# Patient Record
Sex: FEMALE | Race: BLACK OR AFRICAN AMERICAN | NOT HISPANIC OR LATINO | Employment: FULL TIME | URBAN - METROPOLITAN AREA
[De-identification: names, ages, dates, MRNs, and addresses within clinical notes are randomized per-mention and may not be internally consistent; named-entity substitution may affect disease eponyms.]

---

## 2017-04-03 ENCOUNTER — OFFICE VISIT (OUTPATIENT)
Dept: URGENT CARE | Facility: URGENT CARE | Age: 22
End: 2017-04-03
Payer: MEDICAID

## 2017-04-03 VITALS
BODY MASS INDEX: 23.05 KG/M2 | OXYGEN SATURATION: 97 % | HEART RATE: 68 BPM | DIASTOLIC BLOOD PRESSURE: 72 MMHG | WEIGHT: 122 LBS | TEMPERATURE: 97.8 F | SYSTOLIC BLOOD PRESSURE: 110 MMHG

## 2017-04-03 DIAGNOSIS — R30.0 DYSURIA: Primary | ICD-10-CM

## 2017-04-03 LAB
ALBUMIN UR-MCNC: 100 MG/DL
APPEARANCE UR: CLEAR
BACTERIA #/AREA URNS HPF: ABNORMAL /HPF
BILIRUB UR QL STRIP: NEGATIVE
COLOR UR AUTO: YELLOW
GLUCOSE UR STRIP-MCNC: NEGATIVE MG/DL
HGB UR QL STRIP: ABNORMAL
KETONES UR STRIP-MCNC: ABNORMAL MG/DL
LEUKOCYTE ESTERASE UR QL STRIP: ABNORMAL
MUCOUS THREADS #/AREA URNS LPF: PRESENT /LPF
NITRATE UR QL: NEGATIVE
NON-SQ EPI CELLS #/AREA URNS LPF: ABNORMAL /LPF
PH UR STRIP: 6.5 PH (ref 5–7)
RBC #/AREA URNS AUTO: ABNORMAL /HPF (ref 0–2)
SP GR UR STRIP: 1.02 (ref 1–1.03)
URN SPEC COLLECT METH UR: ABNORMAL
UROBILINOGEN UR STRIP-ACNC: 1 EU/DL (ref 0.2–1)
WBC #/AREA URNS AUTO: ABNORMAL /HPF (ref 0–2)

## 2017-04-03 PROCEDURE — 81001 URINALYSIS AUTO W/SCOPE: CPT | Performed by: FAMILY MEDICINE

## 2017-04-03 PROCEDURE — 87088 URINE BACTERIA CULTURE: CPT | Performed by: FAMILY MEDICINE

## 2017-04-03 PROCEDURE — 87186 SC STD MICRODIL/AGAR DIL: CPT | Performed by: FAMILY MEDICINE

## 2017-04-03 PROCEDURE — 87086 URINE CULTURE/COLONY COUNT: CPT | Performed by: FAMILY MEDICINE

## 2017-04-03 PROCEDURE — 99213 OFFICE O/P EST LOW 20 MIN: CPT | Performed by: FAMILY MEDICINE

## 2017-04-03 RX ORDER — NITROFURANTOIN 25; 75 MG/1; MG/1
100 CAPSULE ORAL 2 TIMES DAILY
Qty: 14 CAPSULE | Refills: 0 | Status: SHIPPED | OUTPATIENT
Start: 2017-04-03 | End: 2018-01-02

## 2017-04-03 NOTE — NURSING NOTE
"Chief Complaint   Patient presents with     UTI     x 1week       Initial /72 (BP Location: Left arm, Patient Position: Chair)  Pulse 68  Temp 97.8  F (36.6  C) (Oral)  Wt 122 lb (55.3 kg)  SpO2 97%  BMI 23.05 kg/m2 Estimated body mass index is 23.05 kg/(m^2) as calculated from the following:    Height as of 12/13/16: 5' 1\" (1.549 m).    Weight as of this encounter: 122 lb (55.3 kg).  Medication Reconciliation: complete  "

## 2017-04-03 NOTE — MR AVS SNAPSHOT
"              After Visit Summary   4/3/2017    Kristi French    MRN: [id number]           Patient Information     Date Of Birth          1995        Visit Information        Provider Department      4/3/2017 4:45 PM Lucia Patricia MD [facility]        Today's Diagnoses     Dysuria    -  1       Follow-ups after your visit        Who to contact     If you have questions or need follow up information about today's clinic visit or your schedule please contact [facility] [place] [facility] directly at [phone].  Normal or non-critical lab and imaging results will be communicated to you by MemBlazehart, letter or phone within 4 business days after the clinic has received the results. If you do not hear from us within 7 days, please contact the clinic through MemBlazehart or phone. If you have a critical or abnormal lab result, we will notify you by phone as soon as possible.  Submit refill requests through Kanchufang or call your pharmacy and they will forward the refill request to us. Please allow 3 business days for your refill to be completed.          Additional Information About Your Visit        MyChart Information     Kanchufang lets you send messages to your doctor, view your test results, renew your prescriptions, schedule appointments and more. To sign up, go to [email or web address] . Click on \"Log in\" on the left side of the screen, which will take you to the Welcome page. Then click on \"Sign up Now\" on the right side of the page.     You will be asked to enter the access code listed below, as well as some personal information. Please follow the directions to create your username and password.     Your access code is: [email or web address]  Expires: 2017  5:57 PM     Your access code will  in 90 days. If you need help or a new code, please call your [place] clinic or [phone].        Care EveryWhere ID     This is your Care EveryWhere ID. This could be used by " other organizations to access your [place] medical records  [id number]        Your Vitals Were     Pulse Temperature Pulse Oximetry BMI (Body Mass Index)          68 97.8  F (36.6  C) (Oral) 97% 23.05 kg/m2         Blood Pressure from Last 3 Encounters:   04/03/17 110/72   12/13/16 100/74   11/17/16 118/73    Weight from Last 3 Encounters:   04/03/17 122 lb (55.3 kg)   12/13/16 105 lb (47.6 kg)   11/17/16 110 lb (49.9 kg)              We Performed the Following     UA with Microscopic     Urine Culture Aerobic Bacterial          Today's Medication Changes          These changes are accurate as of: 4/3/17  5:57 PM.  If you have any questions, ask your nurse or doctor.               Start taking these medicines.        Dose/Directions    nitrofurantoin (macrocrystal-monohydrate) 100 MG capsule   Commonly known as:  MACROBID   Used for:  Dysuria   Started by:  Lucia Patricia MD        Dose:  100 mg   Take 1 capsule (100 mg) by mouth 2 times daily   Quantity:  14 capsule   Refills:  0            Where to get your medicines      Some of these will need a paper prescription and others can be bought over the counter.  Ask your nurse if you have questions.     Bring a paper prescription for each of these medications     nitrofurantoin (macrocrystal-monohydrate) 100 MG capsule                Primary Care Provider Fax #    [place] [place] [facility] [phone] [address] [place]  [facility] [address]        Thank you!     Thank you for choosing [facility]  for your care. Our goal is always to provide you with excellent care. Hearing back from our patients is one way we can continue to improve our services. Please take a few minutes to complete the written survey that you may receive in the mail after your visit with us. Thank you!             Your Updated Medication List - Protect others around you: Learn how to safely use, store and throw away your medicines at  www.disposemymeds.org.          This list is accurate as of: 4/3/17  5:57 PM.  Always use your most recent med list.                   Brand Name Dispense Instructions for use    nitrofurantoin (macrocrystal-monohydrate) 100 MG capsule    MACROBID    14 capsule    Take 1 capsule (100 mg) by mouth 2 times daily

## 2017-04-03 NOTE — PROGRESS NOTES
SUBJECTIVE:   Kristi French is a 22 year old female who  presents today for a possible UTI. Symptoms of dysuria and frequency have been going on for 7day(s).  Hematuria no.  gradual onsetand moderate.  There is no history of fever, chills, nausea or vomiting.  No history of vaginal This patient does not have a history of urinary tract infections. Patient denies rigors, flank pain, temperature > 101 degrees F. and Vomiting, significant nausea or diarrhea or vaginal discharge     Past Medical History:   Diagnosis Date     No active medical problems      Current Outpatient Prescriptions   Medication Sig Dispense Refill     nitrofurantoin, macrocrystal-monohydrate, (MACROBID) 100 MG capsule Take 1 capsule (100 mg) by mouth 2 times daily 14 capsule 0     Social History   Substance Use Topics     Smoking status: Never Smoker     Smokeless tobacco: Never Used     Alcohol use No       ROS:   Review of systems negative except as stated above.    OBJECTIVE:  /72 (BP Location: Left arm, Patient Position: Chair)  Pulse 68  Temp 97.8  F (36.6  C) (Oral)  Wt 122 lb (55.3 kg)  SpO2 97%  BMI 23.05 kg/m2  GENERAL APPEARANCE: healthy, alert and no distress  RESP: lungs clear to auscultation - no rales, rhonchi or wheezes  CV: regular rates and rhythm, normal S1 S2, no murmur noted  ABDOMEN:  soft, nontender, no HSM or masses and bowel sounds normal  BACK: No CVA tenderness  SKIN: no suspicious lesions or rashes    Results for orders placed or performed in visit on 04/03/17   UA with Microscopic   Result Value Ref Range    Color Urine Yellow     Appearance Urine Clear     Glucose Urine Negative NEG mg/dL    Bilirubin Urine Negative NEG    Ketones Urine Trace (A) NEG mg/dL    Specific Gravity Urine 1.025 1.003 - 1.035    pH Urine 6.5 5.0 - 7.0 pH    Protein Albumin Urine 100 (A) NEG mg/dL    Urobilinogen Urine 1.0 0.2 - 1.0 EU/dL    Nitrite Urine Negative NEG    Blood Urine Moderate (A) NEG    Leukocyte Esterase Urine  Small (A) NEG    Source Midstream Urine     WBC Urine 5-10 (A) 0 - 2 /HPF    RBC Urine 25-50 (A) 0 - 2 /HPF    Squamous Epithelial /LPF Urine Moderate (A) FEW /LPF    Bacteria Urine Many (A) NEG /HPF    Mucous Urine Present (A) NEG /LPF       ASSESSMENT:   Kristi was seen today for uti.    Diagnoses and all orders for this visit:    Dysuria  -     UA with Microscopic  -     nitrofurantoin, macrocrystal-monohydrate, (MACROBID) 100 MG capsule; Take 1 capsule (100 mg) by mouth 2 times daily  -     Urine Culture Aerobic Bacterial        PLAN:  As per ordered above  Drink plenty of fluids.  Prevention and treatment of UTI's discussed.Signs and symptoms of pyelonephritis mentioned.  Follow up with primary care physician if not improving

## 2017-04-05 LAB
BACTERIA SPEC CULT: ABNORMAL
MICRO REPORT STATUS: ABNORMAL
MICROORGANISM SPEC CULT: ABNORMAL
SPECIMEN SOURCE: ABNORMAL

## 2017-06-30 ENCOUNTER — TELEPHONE (OUTPATIENT)
Dept: OBGYN | Facility: CLINIC | Age: 22
End: 2017-06-30

## 2017-06-30 NOTE — TELEPHONE ENCOUNTER
Pt is unsure when her LMP was, thinks she is just over a month pregnant. Has had nausea throughout the day.  Has vomitied about 3 times.  Asks about management of nausea.    Advised to try danitza root tea or capsule, gingerale, small snacks, unisom with vitamin b6.  I gave her a few options to try and pt will try these and call back if nothing helps.    Shavon Muñoz R.N.  [facility]

## 2017-07-19 ENCOUNTER — TELEPHONE (OUTPATIENT)
Dept: OBGYN | Facility: CLINIC | Age: 22
End: 2017-07-19

## 2017-07-19 ENCOUNTER — RADIANT APPOINTMENT (OUTPATIENT)
Dept: ULTRASOUND IMAGING | Facility: CLINIC | Age: 22
End: 2017-07-19
Attending: OBSTETRICS & GYNECOLOGY
Payer: COMMERCIAL

## 2017-07-19 ENCOUNTER — PRENATAL OFFICE VISIT (OUTPATIENT)
Dept: NURSING | Facility: CLINIC | Age: 22
End: 2017-07-19
Payer: COMMERCIAL

## 2017-07-19 DIAGNOSIS — Z32.01 PREGNANCY TEST POSITIVE: ICD-10-CM

## 2017-07-19 DIAGNOSIS — D64.9 LOW HEMOGLOBIN: Primary | ICD-10-CM

## 2017-07-19 DIAGNOSIS — Z34.01 ENCOUNTER FOR SUPERVISION OF NORMAL FIRST PREGNANCY IN FIRST TRIMESTER: ICD-10-CM

## 2017-07-19 DIAGNOSIS — Z32.01 PREGNANCY TEST POSITIVE: Primary | ICD-10-CM

## 2017-07-19 LAB
ABO + RH BLD: NORMAL
ABO + RH BLD: NORMAL
BETA HCG QUAL IFA URINE: POSITIVE
BLD GP AB SCN SERPL QL: NORMAL
BLOOD BANK CMNT PATIENT-IMP: NORMAL
ERYTHROCYTE [DISTWIDTH] IN BLOOD BY AUTOMATED COUNT: 18.1 % (ref 10–15)
HBV SURFACE AG SERPL QL IA: NONREACTIVE
HCT VFR BLD AUTO: 34.1 % (ref 35–47)
HGB BLD-MCNC: 11.2 G/DL (ref 11.7–15.7)
HIV 1+2 AB+HIV1 P24 AG SERPL QL IA: NORMAL
MCH RBC QN AUTO: 26.6 PG (ref 26.5–33)
MCHC RBC AUTO-ENTMCNC: 32.8 G/DL (ref 31.5–36.5)
MCV RBC AUTO: 81 FL (ref 78–100)
PLATELET # BLD AUTO: 396 10E9/L (ref 150–450)
RBC # BLD AUTO: 4.21 10E12/L (ref 3.8–5.2)
SPECIMEN EXP DATE BLD: NORMAL
WBC # BLD AUTO: 9.4 10E9/L (ref 4–11)

## 2017-07-19 PROCEDURE — 36415 COLL VENOUS BLD VENIPUNCTURE: CPT | Performed by: OBSTETRICS & GYNECOLOGY

## 2017-07-19 PROCEDURE — 86850 RBC ANTIBODY SCREEN: CPT | Performed by: OBSTETRICS & GYNECOLOGY

## 2017-07-19 PROCEDURE — 86780 TREPONEMA PALLIDUM: CPT | Performed by: OBSTETRICS & GYNECOLOGY

## 2017-07-19 PROCEDURE — 84703 CHORIONIC GONADOTROPIN ASSAY: CPT | Performed by: OBSTETRICS & GYNECOLOGY

## 2017-07-19 PROCEDURE — 99207 ZZC NO CHARGE NURSE ONLY: CPT

## 2017-07-19 PROCEDURE — 85027 COMPLETE CBC AUTOMATED: CPT | Performed by: OBSTETRICS & GYNECOLOGY

## 2017-07-19 PROCEDURE — 87340 HEPATITIS B SURFACE AG IA: CPT | Performed by: OBSTETRICS & GYNECOLOGY

## 2017-07-19 PROCEDURE — 87086 URINE CULTURE/COLONY COUNT: CPT | Performed by: OBSTETRICS & GYNECOLOGY

## 2017-07-19 PROCEDURE — 87389 HIV-1 AG W/HIV-1&-2 AB AG IA: CPT | Performed by: OBSTETRICS & GYNECOLOGY

## 2017-07-19 PROCEDURE — 86900 BLOOD TYPING SEROLOGIC ABO: CPT | Performed by: OBSTETRICS & GYNECOLOGY

## 2017-07-19 PROCEDURE — 86901 BLOOD TYPING SEROLOGIC RH(D): CPT | Performed by: OBSTETRICS & GYNECOLOGY

## 2017-07-19 PROCEDURE — 86762 RUBELLA ANTIBODY: CPT | Performed by: OBSTETRICS & GYNECOLOGY

## 2017-07-19 PROCEDURE — 76815 OB US LIMITED FETUS(S): CPT | Performed by: OBSTETRICS & GYNECOLOGY

## 2017-07-19 NOTE — PROGRESS NOTES
Pt attended NPN nurse education class.    Lab Results   Component Value Date    PAP NIL 07/31/2012     Shavon Muñoz R.N.  [facility]

## 2017-07-19 NOTE — MR AVS SNAPSHOT
After Visit Summary   7/19/2017    Kristi French    MRN: [id number]           Patient Information     Date Of Birth          1995        Visit Information        Provider Department      7/19/2017 9:00 AM  PRENATAL NURSE [facility]        Today's Diagnoses     Pregnancy test positive    -  1       Follow-ups after your visit        Your next 10 appointments already scheduled     Jul 19, 2017 11:45 AM CDT   [facility]   [facility] ([facility])    [address] [id number] [phone]           Patient must bring picture ID.  Patient should be prepared to give a urine specimen  Please do not eat 10-12 hours before your appointment if you are coming in fasting for labs on lipids, cholesterol, or glucose (sugar).  Pregnant women should follow their Care Team instructions. Water with medications is okay. Do not drink coffee or other fluids.   If you have concerns about taking  your medications, please ask at office or if scheduling via [employer] send a message by clicking on Secure Messaging, Message Your Care Team.            Jul 19, 2017  2:00 PM CDT   US OB < 14 WEEKS SINGLE with OXUS1   [facility] ([facility])    [address] [id number] [phone]           Please bring a list of your medicines (including vitamins, minerals and over-the-counter drugs). Also, tell your doctor about any allergies you may have. Wear comfortable clothes and leave your valuables at home.  If you re less than 20 weeks drink four 8-ounce glasses of fluid an hour before your exam. If you need to empty your bladder before your exam, try to release only a little urine. Then, drink another glass of fluid.  You may have up to two family members in the exam room. If you bring a small child, an adult must be there to care for him or her.   "Please call the Imaging Department at your exam site with any questions.              Future tests that were ordered for you today     Open Future Orders        Priority Expected Expires Ordered    US OB < 14 weeks, single,  for dating (BCL360) Routine  10/17/2017 2017            Who to contact     If you have questions or need follow up information about today's clinic visit or your schedule please contact [facility] directly at [phone].  Normal or non-critical lab and imaging results will be communicated to you by Capigamihart, letter or phone within 4 business days after the clinic has received the results. If you do not hear from us within 7 days, please contact the clinic through Capigamihart or phone. If you have a critical or abnormal lab result, we will notify you by phone as soon as possible.  Submit refill requests through Kodkod or call your pharmacy and they will forward the refill request to us. Please allow 3 business days for your refill to be completed.          Additional Information About Your Visit        Kodkod Information     Kodkod lets you send messages to your doctor, view your test results, renew your prescriptions, schedule appointments and more. To sign up, go to [email or web address] . Click on \"Log in\" on the left side of the screen, which will take you to the Welcome page. Then click on \"Sign up Now\" on the right side of the page.     You will be asked to enter the access code listed below, as well as some personal information. Please follow the directions to create your username and password.     Your access code is: [email or web address]  Expires: 10/17/2017 10:57 AM     Your access code will  in 90 days. If you need help or a new code, please call your [place] clinic or [phone].        Care EveryWhere ID     This is your Care EveryWhere ID. This could be used by other organizations to access your [place] medical records  [id number]        Your " Vitals Were     Last Period                   04/25/2017 (Within Weeks)            Blood Pressure from Last 3 Encounters:   04/03/17 110/72   12/13/16 100/74   11/17/16 118/73    Weight from Last 3 Encounters:   04/03/17 122 lb (55.3 kg)   12/13/16 105 lb (47.6 kg)   11/17/16 110 lb (49.9 kg)              We Performed the Following     ABO/RH Type and Screen     Anti Treponema     Beta HCG qual IFA urine     CBC with Platelets     Hepatitis B surface antigen     HIV Antigen Antibody Combo     Rubella Antibody IgG Quantitative     Urine Culture Aerobic Bacterial        Primary Care Provider Fax #    [place] [facility] [phone]       [address] [place]  [facility] [phone]        Equal Access to Services     INDY RITCHIE : Hadii aad ku hadasho Soomaali, waaxda luqadaha, qaybta kaalmada adeegyada, waxay idiin haychrisn andie duffy. So [facility] [phone].    ATENCIÓN: Si habla español, tiene a aguilar disposición servicios gratuitos de asistencia lingüística. Llame al [phone].    We comply with applicable federal civil rights laws and [place] laws. We do not discriminate on the basis of race, color, national origin, age, disability sex, sexual orientation or gender identity.            Thank you!     Thank you for choosing [facility]  for your care. Our goal is always to provide you with excellent care. Hearing back from our patients is one way we can continue to improve our services. Please take a few minutes to complete the written survey that you may receive in the mail after your visit with us. Thank you!             Your Updated Medication List - Protect others around you: Learn how to safely use, store and throw away your medicines at www.disposemymeds.org.          This list is accurate as of: 7/19/17 10:57 AM.  Always use your most recent med list.                   Brand Name Dispense Instructions for use Diagnosis    nitroFURantoin  (macrocrystal-monohydrate) 100 MG capsule    MACROBID    14 capsule    Take 1 capsule (100 mg) by mouth 2 times daily    Dysuria

## 2017-07-19 NOTE — TELEPHONE ENCOUNTER
Please call patient with mild anemia. Please have her return for iron studies to include ferretin and TIBC.     Spoke with pt, gave above info and scheduled for lab only.    Shavon Muñoz R.N.  [facility]

## 2017-07-19 NOTE — LETTER
July 19, 2017        Kristi French  [address]  [facility] [address]        To Whom It May Concern,      Kristi was in the clinic this am on July 19, 2017.  Please excuse her form work for this time.  Call with any questions.          Sincerely,         Prenatal Nurse

## 2017-07-20 DIAGNOSIS — Z34.01 ENCOUNTER FOR SUPERVISION OF NORMAL FIRST PREGNANCY IN FIRST TRIMESTER: ICD-10-CM

## 2017-07-20 DIAGNOSIS — D64.9 LOW HEMOGLOBIN: ICD-10-CM

## 2017-07-20 LAB
BACTERIA SPEC CULT: NO GROWTH
MICRO REPORT STATUS: NORMAL
RUBV IGG SERPL IA-ACNC: 167 IU/ML
SPECIMEN SOURCE: NORMAL
T PALLIDUM IGG+IGM SER QL: NEGATIVE

## 2017-07-20 PROCEDURE — 83540 ASSAY OF IRON: CPT | Performed by: OBSTETRICS & GYNECOLOGY

## 2017-07-20 PROCEDURE — 82728 ASSAY OF FERRITIN: CPT | Performed by: OBSTETRICS & GYNECOLOGY

## 2017-07-20 PROCEDURE — 83550 IRON BINDING TEST: CPT | Performed by: OBSTETRICS & GYNECOLOGY

## 2017-07-20 PROCEDURE — 36415 COLL VENOUS BLD VENIPUNCTURE: CPT | Performed by: OBSTETRICS & GYNECOLOGY

## 2017-07-21 LAB
FERRITIN SERPL-MCNC: 6 NG/ML (ref 12–150)
IRON SATN MFR SERPL: 9 % (ref 15–46)
IRON SERPL-MCNC: 38 UG/DL (ref 35–180)
TIBC SERPL-MCNC: 434 UG/DL (ref 240–430)

## 2017-07-27 NOTE — TELEPHONE ENCOUNTER
Updated Pregnancy Episode  Updated LMP from pt 5/2/17  Also US dating PAULINO 2/6/18    US done 7/19/17 Read results to her.  Samuel MEDINA RN

## 2017-08-03 ENCOUNTER — HOSPITAL ENCOUNTER (EMERGENCY)
Facility: CLINIC | Age: 22
Discharge: HOME | End: 2017-08-03
Attending: EMERGENCY MEDICINE | Admitting: EMERGENCY MEDICINE
Payer: COMMERCIAL

## 2017-08-03 ENCOUNTER — OFFICE VISIT (OUTPATIENT)
Dept: URGENT CARE | Facility: URGENT CARE | Age: 22
End: 2017-08-03
Payer: COMMERCIAL

## 2017-08-03 ENCOUNTER — APPOINTMENT (OUTPATIENT)
Dept: ULTRASOUND IMAGING | Facility: CLINIC | Age: 22
End: 2017-08-03
Attending: EMERGENCY MEDICINE
Payer: COMMERCIAL

## 2017-08-03 VITALS
OXYGEN SATURATION: 100 % | SYSTOLIC BLOOD PRESSURE: 90 MMHG | WEIGHT: 123.4 LBS | DIASTOLIC BLOOD PRESSURE: 62 MMHG | TEMPERATURE: 99 F | HEART RATE: 89 BPM | BODY MASS INDEX: 23.32 KG/M2

## 2017-08-03 VITALS
OXYGEN SATURATION: 100 % | SYSTOLIC BLOOD PRESSURE: 96 MMHG | DIASTOLIC BLOOD PRESSURE: 64 MMHG | HEART RATE: 99 BPM | WEIGHT: 123 LBS | HEIGHT: 61 IN | BODY MASS INDEX: 23.22 KG/M2 | TEMPERATURE: 98.4 F | RESPIRATION RATE: 20 BRPM

## 2017-08-03 DIAGNOSIS — Z3A.09 9 WEEKS GESTATION OF PREGNANCY: ICD-10-CM

## 2017-08-03 DIAGNOSIS — R10.9 ABDOMINAL PAIN AFFECTING PREGNANCY: ICD-10-CM

## 2017-08-03 DIAGNOSIS — R10.30 LOWER ABDOMINAL PAIN: Primary | ICD-10-CM

## 2017-08-03 DIAGNOSIS — O26.899 ABDOMINAL PAIN AFFECTING PREGNANCY: ICD-10-CM

## 2017-08-03 LAB
ABO + RH BLD: NORMAL
ALBUMIN UR-MCNC: NEGATIVE MG/DL
APPEARANCE UR: CLEAR
B-HCG SERPL-ACNC: ABNORMAL IU/L (ref 0–5)
BACTERIA #/AREA URNS HPF: ABNORMAL /HPF
BASOPHILS # BLD AUTO: 0 10E9/L (ref 0–0.2)
BASOPHILS NFR BLD AUTO: 0.2 %
BILIRUB UR QL STRIP: NEGATIVE
BLOOD BANK CMNT PATIENT-IMP: NORMAL
BLOOD BANK CMNT PATIENT-IMP: NORMAL
COLOR UR AUTO: YELLOW
DIFFERENTIAL METHOD BLD: ABNORMAL
EOSINOPHIL # BLD AUTO: 0.1 10E9/L (ref 0–0.7)
EOSINOPHIL NFR BLD AUTO: 0.6 %
ERYTHROCYTE [DISTWIDTH] IN BLOOD BY AUTOMATED COUNT: 17.9 % (ref 10–15)
FETAL CELL SCN BLD QL ROSETTE: NORMAL
GLUCOSE UR STRIP-MCNC: NEGATIVE MG/DL
HCT VFR BLD AUTO: 32 % (ref 35–47)
HGB BLD-MCNC: 11 G/DL (ref 11.7–15.7)
HGB UR QL STRIP: ABNORMAL
IMM GRANULOCYTES # BLD: 0.1 10E9/L (ref 0–0.4)
IMM GRANULOCYTES NFR BLD: 0.6 %
KETONES UR STRIP-MCNC: ABNORMAL MG/DL
LEUKOCYTE ESTERASE UR QL STRIP: NEGATIVE
LYMPHOCYTES # BLD AUTO: 3.4 10E9/L (ref 0.8–5.3)
LYMPHOCYTES NFR BLD AUTO: 27.5 %
MCH RBC QN AUTO: 27.1 PG (ref 26.5–33)
MCHC RBC AUTO-ENTMCNC: 34.4 G/DL (ref 31.5–36.5)
MCV RBC AUTO: 79 FL (ref 78–100)
MICRO REPORT STATUS: NORMAL
MONOCYTES # BLD AUTO: 0.7 10E9/L (ref 0–1.3)
MONOCYTES NFR BLD AUTO: 5.9 %
MUCOUS THREADS #/AREA URNS LPF: PRESENT /LPF
NEUTROPHILS # BLD AUTO: 8.1 10E9/L (ref 1.6–8.3)
NEUTROPHILS NFR BLD AUTO: 65.2 %
NITRATE UR QL: NEGATIVE
NON-SQ EPI CELLS #/AREA URNS LPF: ABNORMAL /LPF
NRBC # BLD AUTO: 0 10*3/UL
NRBC BLD AUTO-RTO: 0 /100
PH UR STRIP: 5.5 PH (ref 5–7)
PLATELET # BLD AUTO: 367 10E9/L (ref 150–450)
RBC # BLD AUTO: 4.06 10E12/L (ref 3.8–5.2)
RBC #/AREA URNS AUTO: ABNORMAL /HPF (ref 0–2)
RH IG VIALS RECOM PATIENT: NORMAL
SP GR UR STRIP: 1.02 (ref 1–1.03)
SPECIMEN EXP DATE BLD: NORMAL
SPECIMEN SOURCE: NORMAL
URN SPEC COLLECT METH UR: ABNORMAL
UROBILINOGEN UR STRIP-ACNC: 0.2 EU/DL (ref 0.2–1)
WBC # BLD AUTO: 12.4 10E9/L (ref 4–11)
WBC #/AREA URNS AUTO: ABNORMAL /HPF (ref 0–2)
WET PREP SPEC: NORMAL

## 2017-08-03 PROCEDURE — 76801 OB US < 14 WKS SINGLE FETUS: CPT

## 2017-08-03 PROCEDURE — 87491 CHLMYD TRACH DNA AMP PROBE: CPT | Performed by: EMERGENCY MEDICINE

## 2017-08-03 PROCEDURE — 99285 EMERGENCY DEPT VISIT HI MDM: CPT | Mod: 25

## 2017-08-03 PROCEDURE — 25000128 H RX IP 250 OP 636: Performed by: EMERGENCY MEDICINE

## 2017-08-03 PROCEDURE — 87210 SMEAR WET MOUNT SALINE/INK: CPT | Performed by: EMERGENCY MEDICINE

## 2017-08-03 PROCEDURE — 84702 CHORIONIC GONADOTROPIN TEST: CPT | Performed by: EMERGENCY MEDICINE

## 2017-08-03 PROCEDURE — 86901 BLOOD TYPING SEROLOGIC RH(D): CPT | Performed by: EMERGENCY MEDICINE

## 2017-08-03 PROCEDURE — 87591 N.GONORRHOEAE DNA AMP PROB: CPT | Performed by: EMERGENCY MEDICINE

## 2017-08-03 PROCEDURE — 99213 OFFICE O/P EST LOW 20 MIN: CPT | Performed by: PHYSICIAN ASSISTANT

## 2017-08-03 PROCEDURE — 85025 COMPLETE CBC W/AUTO DIFF WBC: CPT | Performed by: EMERGENCY MEDICINE

## 2017-08-03 PROCEDURE — 81001 URINALYSIS AUTO W/SCOPE: CPT | Performed by: EMERGENCY MEDICINE

## 2017-08-03 PROCEDURE — 85461 HEMOGLOBIN FETAL: CPT | Performed by: EMERGENCY MEDICINE

## 2017-08-03 PROCEDURE — 86900 BLOOD TYPING SEROLOGIC ABO: CPT | Performed by: EMERGENCY MEDICINE

## 2017-08-03 RX ORDER — PRENATAL VIT/IRON FUM/FOLIC AC 27MG-0.8MG
1 TABLET ORAL DAILY
COMMUNITY
End: 2019-02-09

## 2017-08-03 ASSESSMENT — ENCOUNTER SYMPTOMS
DYSURIA: 0
ABDOMINAL PAIN: 1
FEVER: 0

## 2017-08-03 NOTE — ED AVS SNAPSHOT
Emergency Department    [address] [facility] [address]    Phone:  [phone]    Fax:  [phone]                                       Kristi French   MRN: [id number]    Department:   Emergency Department   Date of Visit:  8/3/2017           Patient Information     Date Of Birth          1995        Your diagnoses for this visit were:     Abdominal pain affecting pregnancy        You were seen by Christiano Morocho MD.      Follow-up Information     Follow up with [facility], [place] L In 2 days.    Contact information:    [address]  [facility] [address]          Follow up with  Emergency Department.    Specialty:  EMERGENCY MEDICINE    Why:  As needed, If symptoms worsen    Contact information:    [address] [facility] [address] [phone]        Discharge Instructions         Abdominal Pain and Early Pregnancy    (To rule out ectopic pregnancy or miscarriage)  Our tests show that you are pregnant, but the exact cause of your pain isn t clear.  Some pain and bleeding are common early in pregnancy. Often they stop, and you can go on to have a normal pregnancy and baby. Other times the pain or bleeding can be signs of a miscarriage or ectopic pregnancy. An ectopic pregnancy is a very serious problem. At this time it is unclear if your pregnancy will continue normally, if you will have a miscarriage, or if you could have an ectopic pregnancy. Below is some information about this.  Miscarriage  At this time we don t know whether you will have a miscarriage, or if things will clear up and your pregnancy will continue normally. We understand that this is emotionally difficult. There is little we can say to change the way you feel. But understand that miscarriages are common.  About 1 or 2 out of every 10 pregnancies end this way. Some end even before you know you are pregnant. This happens for a number of reasons, and usually  we never figure out why. It s important you know that it is not your fault. It didn t happen because you did anything wrong.  Having sex or exercising does not cause a miscarriage. These activities are usually safe unless you have pain or bleeding or your doctor tells you to stop. Even minor falls won t cause a miscarriage. Miscarriages happen because things were not developing as they were supposed to. No medicine can prevent a miscarriage.  Ectopic pregnancy  In a normal pregnancy, the fertilized egg attaches to the wall of the womb (uterus). In an ectopic or tubal pregnancy, the fertilized egg attaches outside the uterus, usually in the fallopian tube. Very rarely, the egg attaches to an ovary or somewhere else in the abdomen. An ectopic pregnancy is much less common than a miscarriage, but it is very serious. The baby cannot survive, and as it grows it can rupture the tube. This can cause internal bleeding and even death. Risk factors for an ectopic are:    An ectopic pregnancy in the past    Pelvic inflammatory disease, or PID    Endometriosis    Smoking    An IUD  Additional tests  Because we don t know what s causing your symptoms, you will need more tests to figure out what the problem is. You may need:  Ultrasound  An ultrasound can usually find a normal pregnancy as early as 4 to 5 weeks along. If the ultrasound does not show the baby inside the uterus, it means that:    You have a normal pregnancy less than 4 weeks along, or    You are having or recently had a miscarriage, or    You have an ectopic pregnancy  Quantitative HCG  This test measures the amount of a pregnancy hormone in your blood. Comparing today's test result to a repeat test in 2 days will show whether you have a normal pregnancy.  Laparoscopy  This is a type of surgery. The healthcare provider will put a tube with a light inside your belly (abdomen) to look directly at your pelvic organs. This test is used when it is not safe to wait 2  days for blood test results.  Important information  If you do have an ectopic pregnancy, there is a small chance that the growing fetus can tear the fallopian tube. This can cause severe internal bleeding. If this happens, you may have:    Sudden severe pain in your lower abdomen    Vaginal bleeding    Weakness, dizziness, and sometimes fainting  If any of these symptoms occur:    Call 911 or return immediately to the hospital.    Do not drive yourself.    Do not go to your healthcare provider's office or to a clinic. Go to the hospital.   Home care  Follow these guidelines to help care for yourself at home:    Rest until your next exam. Don t do anything strenuous.    Eat a light diet with foods that are easy to digest.    Don t have sex until your healthcare provider says it s OK.  Follow-up care  Follow up with your healthcare provider, or as advised. If you were told to have a repeat blood test in 2 days, it s important to get it done.  If you had an X-ray or ultrasound, a radiologist will review it. You will be told of any new findings that may affect your care.  Call 911  Call 911 if you have any of these:    Severe pain and very heavy bleeding    Severe lightheadedness, passing out, or fainting    Rapid heart rate    Trouble breathing    Confused or difficulty waking up  When to seek medical advice  Call your healthcare provider right away if any of these occur:    The pain in your abdomen gets worse, either suddenly or gradually.    You are dizzy or weak when you stand.    You have heavy vaginal bleeding. This means soaking 1 pad an hour for 3 hours.    You have vaginal bleeding for more than 5 days.    You have repeated vomiting or diarrhea.    The pain in your abdomen moves to the lower right.    You have blood in your vomit or bowel movements. This will be dark red or black.    You have a fever of 100.4 F (38 C) or higher, or as directed by your healthcare provider  Date Last Reviewed: 10/1/2016     9047-5431 [employer]. [address] [place] [address], [place] [address]. All rights reserved. This information is not intended as a substitute for professional medical care. Always follow your healthcare professional's instructions.          24 Hour Appointment Hotline       To make an appointment at any [facility], call [phone] ([phone]). If you don't have a family doctor or clinic, we will help you find one. [facility] are conveniently located to serve the needs of you and your family.             Review of your medicines      Our records show that you are taking the medicines listed below. If these are incorrect, please call your family doctor or clinic.        Dose / Directions Last dose taken    nitroFURantoin (macrocrystal-monohydrate) 100 MG capsule   Commonly known as:  MACROBID   Dose:  100 mg   Quantity:  14 capsule        Take 1 capsule (100 mg) by mouth 2 times daily   Refills:  0        prenatal multivitamin  plus iron 27-0.8 MG Tabs per tablet   Dose:  1 tablet        Take 1 tablet by mouth daily   Refills:  0                Procedures and tests performed during your visit     *UA reflex to Microscopic    CBC with platelets differential    Chlamydia trachomatis PCR    HCG QUANTitative pregnancy    Neisseria gonorrhoeae PCR    Rh Immune Globulin Study    Rh type    Rho (D) immune globulin (RhoGam) Lab Study     OB < 14 Weeks w Transvaginal    Urine Microscopic    Wet prep      Orders Needing Specimen Collection     Ordered          08/03/17 1908  UA with Microscopic - STAT, Prio: STAT, Needs to be Collected     Scheduled Task Status   08/03/17 1909 Collect UA with Microscopic Open   Order Class:  PCU Collect                  Pending Results     Date and Time Order Name Status Description    8/3/2017 2053 Neisseria gonorrhoeae PCR In process     8/3/2017 2053 Chlamydia trachomatis PCR In process             Pending Culture Results     Date and Time Order Name Status  Description    8/3/2017 2053 Neisseria gonorrhoeae PCR In process     8/3/2017 2053 Chlamydia trachomatis PCR In process             Pending Results Instructions     If you had any lab results that were not finalized at the time of your Discharge, you can call the ED Lab Result RN at [phone]. You will be contacted by this team for any positive Lab results or changes in treatment. The nurses are available 7 days a week from 10A to 6:30P.  You can leave a message 24 hours per day and they will return your call.        Test Results From Your Hospital Stay        8/3/2017  7:29 PM      Component Results     Component Value Ref Range & Units Status    Color Urine Yellow  Final    Appearance Urine Clear  Final    Glucose Urine Negative NEG mg/dL Final    Bilirubin Urine Negative NEG Final    Ketones Urine Trace (A) NEG mg/dL Final    Specific Gravity Urine 1.025 1.003 - 1.035 Final    Blood Urine Small (A) NEG Final    pH Urine 5.5 5.0 - 7.0 pH Final    Protein Albumin Urine Negative NEG mg/dL Final    Urobilinogen Urine 0.2 0.2 - 1.0 EU/dL Final    Nitrite Urine Negative NEG Final    Leukocyte Esterase Urine Negative NEG Final    Source Midstream Urine  Final         8/3/2017  8:00 PM      Component Results     Component Value Ref Range & Units Status    WBC 12.4 (H) 4.0 - 11.0 10e9/L Final    RBC Count 4.06 3.8 - 5.2 10e12/L Final    Hemoglobin 11.0 (L) 11.7 - 15.7 g/dL Final    Hematocrit 32.0 (L) 35.0 - 47.0 % Final    MCV 79 78 - 100 fl Final    MCH 27.1 26.5 - 33.0 pg Final    MCHC 34.4 31.5 - 36.5 g/dL Final    RDW 17.9 (H) 10.0 - 15.0 % Final    Platelet Count 367 150 - 450 10e9/L Final    Diff Method Automated Method  Final    % Neutrophils 65.2 % Final    % Lymphocytes 27.5 % Final    % Monocytes 5.9 % Final    % Eosinophils 0.6 % Final    % Basophils 0.2 % Final    % Immature Granulocytes 0.6 % Final    Nucleated RBCs 0 0 /100 Final    Absolute Neutrophil 8.1 1.6 - 8.3 10e9/L Final    Absolute Lymphocytes  3.4 0.8 - 5.3 10e9/L Final    Absolute Monocytes 0.7 0.0 - 1.3 10e9/L Final    Absolute Eosinophils 0.1 0.0 - 0.7 10e9/L Final    Absolute Basophils 0.0 0.0 - 0.2 10e9/L Final    Abs Immature Granulocytes 0.1 0 - 0.4 10e9/L Final    Absolute Nucleated RBC 0.0  Final         8/3/2017  8:39 PM      Component Results     Component Value Ref Range & Units Status    HCG Quantitative Serum 59466 (H) 0 - 5 IU/L Final         8/3/2017  8:19 PM      Component Results     Component    ABO    O    RH(D)     Pos    Specimen Expires    08/06/2017         8/3/2017  7:11 PM      Component Results     Component    Rhogam Order    Order received   See Rhogam Study/Suitability           8/3/2017  8:44 PM      Narrative     ULTRASOUND OBSTETRIC LESS THAN 14 WEEKS 8/3/2017 8:39 PM    HISTORY: pelvic pain    TECHNIQUE: Transabdominal  imaging were performed.      COMPARISON:  None.    FINDINGS:    Estimated gestational age by current ultrasound measurement: 13 weeks  4 days.  Estimated date of delivery based on this ultrasound: 2/4/2018.  Crown-rump length: 7.3 cm.   Embryonic cardiac activity: 160 bpm.   Yolk sac: Not visualized  Subchorionic hemorrhage: None..  Placenta is forming posteriorly    Right ovary: Not visualized.  Left ovary: Not visualized.  Adnexal mass: None.  Free pelvic fluid: None.        Impression     IMPRESSION: Single live intrauterine pregnancy 13 weeks 4 days  estimated gestational age. Ovaries are not visualized.    PRABHAKAR IQBAL MD         8/3/2017  7:29 PM      Component Results     Component Value Ref Range & Units Status    WBC Urine O - 2 0 - 2 /HPF Final    RBC Urine O - 2 0 - 2 /HPF Final    Squamous Epithelial /LPF Urine Many (A) FEW /LPF Final    Bacteria Urine Moderate (A) NEG /HPF Final    Mucous Urine Present (A) NEG /LPF Final         8/3/2017  8:20 PM      Component Results     Component    ABO    O    RH(D)     Pos    Fetal Blood Screen    Canceled, Test credited    Blood Bank Comment    Not  suitable for Rh Immune Globulin  PATIENT IS RH POSITIVE      Amount of RHIG Required    Not suitable for Rh Immune Globulin         8/3/2017  9:25 PM      Component Results     Component    Specimen Description    Cervix    Wet Prep    No Trichomonas seen  No yeast seen  No clue cells seen  PMNs seen Rare      Micro Report Status    FINAL 08/03/2017         8/3/2017  9:13 PM         8/3/2017  9:13 PM                Clinical Quality Measure: Blood Pressure Screening     Your blood pressure was checked while you were in the emergency department today. The last reading we obtained was  BP: 108/70 . Please read the guidelines below about what these numbers mean and what you should do about them.  If your systolic blood pressure (the top number) is less than 120 and your diastolic blood pressure (the bottom number) is less than 80, then your blood pressure is normal. There is nothing more that you need to do about it.  If your systolic blood pressure (the top number) is 120-139 or your diastolic blood pressure (the bottom number) is 80-89, your blood pressure may be higher than it should be. You should have your blood pressure rechecked within a year by a primary care provider.  If your systolic blood pressure (the top number) is 140 or greater or your diastolic blood pressure (the bottom number) is 90 or greater, you may have high blood pressure. High blood pressure is treatable, but if left untreated over time it can put you at risk for heart attack, stroke, or kidney failure. You should have your blood pressure rechecked by a primary care provider within the next 4 weeks.  If your provider in the emergency department today gave you specific instructions to follow-up with your doctor or provider even sooner than that, you should follow that instruction and not wait for up to 4 weeks for your follow-up visit.        Thank you for choosing [facility]       Thank you for choosing [facility] for your care. Our goal is always  "to provide you with excellent care. Hearing back from our patients is one way we can continue to improve our services. Please take a few minutes to complete the written survey that you may receive in the mail after you visit with us. Thank you!        [employer] Information     [employer] lets you send messages to your doctor, view your test results, renew your prescriptions, schedule appointments and more. To sign up, go to [email or web address] . Click on \"Log in\" on the left side of the screen, which will take you to the Welcome page. Then click on \"Sign up Now\" on the right side of the page.     You will be asked to enter the access code listed below, as well as some personal information. Please follow the directions to create your username and password.     Your access code is: [email or web address]  Expires: 10/17/2017 10:57 AM     Your access code will  in 90 days. If you need help or a new code, please call your [place] clinic or [phone].        Care EveryWhere ID     This is your Care EveryWhere ID. This could be used by other organizations to access your [place] medical records  [id number]        Equal Access to Services     INDY RITCHIE AH: Hadii ruth Gonzalez, wacelestino casillas, qalaw kaalmalevi treadwell, faye duffy. So [facility] [phone].    ATENCIÓN: Si habla español, tiene a aguilar disposición servicios gratuitos de asistencia lingüística. Aristeo al [phone].    We comply with applicable federal civil rights laws and [place] laws. We do not discriminate on the basis of race, color, national origin, age, disability sex, sexual orientation or gender identity.            After Visit Summary       This is your record. Keep this with you and show to your community pharmacist(s) and doctor(s) at your next visit.                  "

## 2017-08-03 NOTE — NURSING NOTE
"Chief Complaint   Patient presents with     Abdominal Pain     pt states lower abdominal pain, 9x weeks pregnant  1x week        Initial BP 90/62 (BP Location: Left arm, Patient Position: Chair, Cuff Size: Adult Regular)  Pulse 89  Temp 99  F (37.2  C) (Oral)  Wt 123 lb 6.4 oz (56 kg)  LMP 05/02/2017  SpO2 100%  BMI 23.32 kg/m2 Estimated body mass index is 23.32 kg/(m^2) as calculated from the following:    Height as of 12/13/16: 5' 1\" (1.549 m).    Weight as of this encounter: 123 lb 6.4 oz (56 kg).  Medication Reconciliation: complete    "

## 2017-08-03 NOTE — LETTER
To Whom it may concern:      Kristi French was seen in our Emergency Department today, 08/03/17.  I expect her condition to improve over the next 1 days.  she may return to work/school when improved.    Sincerely,  Christiano Morocho MD

## 2017-08-03 NOTE — MR AVS SNAPSHOT
"              After Visit Summary   8/3/2017    Kristi French    MRN: [id number]           Patient Information     Date Of Birth          1995        Visit Information        Provider Department      8/3/2017 4:30 PM Jerson Cespedes PA-C [facility]        Today's Diagnoses     Lower abdominal pain    -  1    9 weeks gestation of pregnancy           Follow-ups after your visit        Who to contact     If you have questions or need follow up information about today's clinic visit or your schedule please contact [facility] directly at [phone].  Normal or non-critical lab and imaging results will be communicated to you by [employer], letter or phone within 4 business days after the clinic has received the results. If you do not hear from us within 7 days, please contact the clinic through [employer] or phone. If you have a critical or abnormal lab result, we will notify you by phone as soon as possible.  Submit refill requests through [employer] or call your pharmacy and they will forward the refill request to us. Please allow 3 business days for your refill to be completed.          Additional Information About Your Visit        MyChart Information     [employer] lets you send messages to your doctor, view your test results, renew your prescriptions, schedule appointments and more. To sign up, go to [email or web address] . Click on \"Log in\" on the left side of the screen, which will take you to the Welcome page. Then click on \"Sign up Now\" on the right side of the page.     You will be asked to enter the access code listed below, as well as some personal information. Please follow the directions to create your username and password.     Your access code is: [email or web address]  Expires: 10/17/2017 10:57 AM     Your access code will  in 90 days. If you need help or a new code, please call your [place] clinic or [phone].        Care EveryWhere ID     " This is your Care EveryWhere ID. This could be used by other organizations to access your [place] medical records  [id number]        Your Vitals Were     Pulse Temperature Last Period Pulse Oximetry BMI (Body Mass Index)       89 99  F (37.2  C) (Oral) 05/02/2017 100% 23.32 kg/m2        Blood Pressure from Last 3 Encounters:   08/03/17 96/64   08/03/17 90/62   04/03/17 110/72    Weight from Last 3 Encounters:   08/03/17 123 lb (55.8 kg)   08/03/17 123 lb 6.4 oz (56 kg)   04/03/17 122 lb (55.3 kg)              Today, you had the following     No orders found for display       Primary Care Provider Fax #    [facility] [phone]       [address]  [facility] [address]        Equal Access to Services     INDY RITCHIE : Hadii aad ku hadasho Soomaali, waaxda luqadaha, qaybta kaalmada adeegyada, faye nguyenin hayaan andie villalobos . So [facility] [phone].    ATENCIÓN: Si habla español, tiene a aguilar disposición servicios gratuitos de asistencia lingüística. Llame al [phone].    We comply with applicable federal civil rights laws and [place] laws. We do not discriminate on the basis of race, color, national origin, age, disability sex, sexual orientation or gender identity.            Thank you!     Thank you for choosing [facility]  for your care. Our goal is always to provide you with excellent care. Hearing back from our patients is one way we can continue to improve our services. Please take a few minutes to complete the written survey that you may receive in the mail after your visit with us. Thank you!             Your Updated Medication List - Protect others around you: Learn how to safely use, store and throw away your medicines at www.disposemymeds.org.          This list is accurate as of: 8/3/17 11:59 PM.  Always use your most recent med list.                   Brand Name Dispense Instructions for use Diagnosis    nitroFURantoin  (macrocrystal-monohydrate) 100 MG capsule    MACROBID    14 capsule    Take 1 capsule (100 mg) by mouth 2 times daily    Dysuria       prenatal multivitamin  plus iron 27-0.8 MG Tabs per tablet      Take 1 tablet by mouth daily

## 2017-08-03 NOTE — ED AVS SNAPSHOT
Emergency Department    [address] [place] [address]    Phone:  [phone]    Fax:  [phone]                                       Kristi French   MRN: [id number]    Department:   Emergency Department   Date of Visit:  8/3/2017           After Visit Summary Signature Page     I have received my discharge instructions, and my questions have been answered. I have discussed any challenges I see with this plan with the nurse or doctor.    ..........................................................................................................................................  Patient/Patient Representative Signature      ..........................................................................................................................................  Patient Representative Print Name and Relationship to Patient    ..................................................               ................................................  Date                                            Time    ..........................................................................................................................................  Reviewed by Signature/Title    ...................................................              ..............................................  Date                                                            Time

## 2017-08-04 LAB
C TRACH DNA SPEC QL NAA+PROBE: NORMAL
N GONORRHOEA DNA SPEC QL NAA+PROBE: NORMAL
SPECIMEN SOURCE: NORMAL
SPECIMEN SOURCE: NORMAL

## 2017-08-04 NOTE — DISCHARGE INSTRUCTIONS
Abdominal Pain and Early Pregnancy    (To rule out ectopic pregnancy or miscarriage)  Our tests show that you are pregnant, but the exact cause of your pain isn t clear.  Some pain and bleeding are common early in pregnancy. Often they stop, and you can go on to have a normal pregnancy and baby. Other times the pain or bleeding can be signs of a miscarriage or ectopic pregnancy. An ectopic pregnancy is a very serious problem. At this time it is unclear if your pregnancy will continue normally, if you will have a miscarriage, or if you could have an ectopic pregnancy. Below is some information about this.  Miscarriage  At this time we don t know whether you will have a miscarriage, or if things will clear up and your pregnancy will continue normally. We understand that this is emotionally difficult. There is little we can say to change the way you feel. But understand that miscarriages are common.  About 1 or 2 out of every 10 pregnancies end this way. Some end even before you know you are pregnant. This happens for a number of reasons, and usually we never figure out why. It s important you know that it is not your fault. It didn t happen because you did anything wrong.  Having sex or exercising does not cause a miscarriage. These activities are usually safe unless you have pain or bleeding or your doctor tells you to stop. Even minor falls won t cause a miscarriage. Miscarriages happen because things were not developing as they were supposed to. No medicine can prevent a miscarriage.  Ectopic pregnancy  In a normal pregnancy, the fertilized egg attaches to the wall of the womb (uterus). In an ectopic or tubal pregnancy, the fertilized egg attaches outside the uterus, usually in the fallopian tube. Very rarely, the egg attaches to an ovary or somewhere else in the abdomen. An ectopic pregnancy is much less common than a miscarriage, but it is very serious. The baby cannot survive, and as it grows it can rupture  the tube. This can cause internal bleeding and even death. Risk factors for an ectopic are:    An ectopic pregnancy in the past    Pelvic inflammatory disease, or PID    Endometriosis    Smoking    An IUD  Additional tests  Because we don t know what s causing your symptoms, you will need more tests to figure out what the problem is. You may need:  Ultrasound  An ultrasound can usually find a normal pregnancy as early as 4 to 5 weeks along. If the ultrasound does not show the baby inside the uterus, it means that:    You have a normal pregnancy less than 4 weeks along, or    You are having or recently had a miscarriage, or    You have an ectopic pregnancy  Quantitative HCG  This test measures the amount of a pregnancy hormone in your blood. Comparing today's test result to a repeat test in 2 days will show whether you have a normal pregnancy.  Laparoscopy  This is a type of surgery. The healthcare provider will put a tube with a light inside your belly (abdomen) to look directly at your pelvic organs. This test is used when it is not safe to wait 2 days for blood test results.  Important information  If you do have an ectopic pregnancy, there is a small chance that the growing fetus can tear the fallopian tube. This can cause severe internal bleeding. If this happens, you may have:    Sudden severe pain in your lower abdomen    Vaginal bleeding    Weakness, dizziness, and sometimes fainting  If any of these symptoms occur:    Call 911 or return immediately to the hospital.    Do not drive yourself.    Do not go to your healthcare provider's office or to a clinic. Go to the hospital.   Home care  Follow these guidelines to help care for yourself at home:    Rest until your next exam. Don t do anything strenuous.    Eat a light diet with foods that are easy to digest.    Don t have sex until your healthcare provider says it s OK.  Follow-up care  Follow up with your healthcare provider, or as advised. If you were told  to have a repeat blood test in 2 days, it s important to get it done.  If you had an X-ray or ultrasound, a radiologist will review it. You will be told of any new findings that may affect your care.  Call 911  Call 911 if you have any of these:    Severe pain and very heavy bleeding    Severe lightheadedness, passing out, or fainting    Rapid heart rate    Trouble breathing    Confused or difficulty waking up  When to seek medical advice  Call your healthcare provider right away if any of these occur:    The pain in your abdomen gets worse, either suddenly or gradually.    You are dizzy or weak when you stand.    You have heavy vaginal bleeding. This means soaking 1 pad an hour for 3 hours.    You have vaginal bleeding for more than 5 days.    You have repeated vomiting or diarrhea.    The pain in your abdomen moves to the lower right.    You have blood in your vomit or bowel movements. This will be dark red or black.    You have a fever of 100.4 F (38 C) or higher, or as directed by your healthcare provider  Date Last Reviewed: 10/1/2016    0967-5119 [employer]. [address] [place] [address], [place] [address]. All rights reserved. This information is not intended as a substitute for professional medical care. Always follow your healthcare professional's instructions.

## 2017-08-04 NOTE — PROGRESS NOTES
"SUBJECTIVE  HPI:  Kristi French is a 22 year old female who presents with the CC of abdominal/pelvic pain.    Pain is located in the suprapubic, RLQ and LLQ area, with radiation to None.  The pain is characterized as dull, aching, sharp and \"pain\", and at worst is a level 8 on a scale of 1-10.  Pain has been present for 1 day(s) and is slowly progressive.  EXACERBATING FACTORS: walking around  RELIEVING FACTORS: nothing.  ASSOCIATED SX: lower abdominal pain, cramps.    Past Medical History:   Diagnosis Date     No active medical problems      No Known Allergies  Social History   Substance Use Topics     Smoking status: Never Smoker     Smokeless tobacco: Never Used     Alcohol use No       ROS:  CONSTITUTIONAL:NEGATIVE for fever, chills, change in weight  INTEGUMENTARY/SKIN: NEGATIVE for worrisome rashes, moles or lesions  ENT/MOUTH: NEGATIVE for ear, mouth and throat problems  RESP:NEGATIVE for significant cough or SOB  CV: NEGATIVE for chest pain, palpitations or peripheral edema  GI: POSITIVE for lower abdominal pain, tenderness  MUSCULOSKELETAL: NEGATIVE for significant arthralgias or myalgia  NEURO: NEGATIVE for weakness, dizziness or paresthesias    OBJECTIVE:  BP 90/62 (BP Location: Left arm, Patient Position: Chair, Cuff Size: Adult Regular)  Pulse 89  Temp 99  F (37.2  C) (Oral)  Wt 123 lb 6.4 oz (56 kg)  LMP 05/02/2017  SpO2 100%  BMI 23.32 kg/m2  GENERAL APPEARANCE: healthy, alert and no distress  NECK: supple, nontender, no lymphadenopathy  RESP: lungs clear to auscultation - no rales, rhonchi or wheezes  CV: regular rates and rhythm, normal S1 S2, no murmur noted  ABDOMEN: soft, normal bowel sounds, tenderness moderate lower  NEURO: Normal strength and tone, sensory exam grossly normal,  normal speech and mentation  SKIN: no suspicious lesions or rashes    ASSESSMENT/PLAN      ICD-10-CM    1. Lower abdominal pain R10.30    2. 9 weeks gestation of pregnancy Z3A.09        Patient sent to the " ED for evaluation, pregnant with abdominal pain

## 2017-08-04 NOTE — ED PROVIDER NOTES
"  History     Chief Complaint:  Abdominal Pain    HPI   Kristi French is a 22 year old female who is 9-11 weeks pregnant without complication and presents to the emergency department today for evaluation of lower abdominal pain for the past week. She has had an ultrasound performed around the 8 week aston and the pregnancy looked normal. The patient has had one other successful pregnancy in the past and has not had any previous abdominal surgeries. She has been nauseated, but not currently. No fever, dysuria, or vaginal bleeding noted.     Allergies:  No Known Drug Allergies    Medications:    Prenatal Vit-Fe Fumarate-FA (PRENATAL MULTIVITAMIN  PLUS IRON) 27-0.8 MG TABS per tablet  nitrofurantoin, macrocrystal-monohydrate, (MACROBID) 100 MG capsule     Past Medical History:    History reviewed. No pertinent past medical history.    Past Surgical History:    History reviewed. No pertinent past surgical history.    Family History:    Cancer    Social History:  The patient was accompanied to the ED by her sister.  Smoking Status: never  Marital Status:  Single [1]    Review of Systems   Constitutional: Negative for fever.   Gastrointestinal: Positive for abdominal pain (lower).   Genitourinary: Negative for dysuria and vaginal bleeding.   All other systems reviewed and are negative.    Physical Exam     Patient Vitals for the past 24 hrs:   BP Temp Temp src Pulse Resp SpO2 Height Weight   08/03/17 2135 - - - - - 100 % - -   08/03/17 2134 96/64 - - - - - - -   08/03/17 1723 108/70 98.4  F (36.9  C) Oral 99 20 99 % 1.549 m (5' 1\") 55.8 kg (123 lb)       Physical Exam  General: Alert, interactive in mild distress  Head:  Scalp is atraumatic  Eyes:  The pupils are equal, round, and reactive to light    EOM's intact    No scleral icterus  ENT:      Nose:  The external nose is normal  Ears:  External ears are normal  Mouth/Throat: The oropharynx is normal    Mucus membranes are moist       Neck:  Normal range of motion. "      There is no rigidity.    Trachea is in the midline         CV:  Regular rate and rhythm    No murmur   Resp:  Breath sounds are clear bilaterally    Non-labored, no retractions or accessory muscle use      GI:  Abdomen is soft, no distension, suprapubic tenderness, gravid abdomen.   :  No CMT, no vaginal bleeding or discharge, closed cervical os      MS:  Normal strength in all 4 extremities  Skin:  Warm and dry, No rash or lesions noted.  Neuro: Strength 5/5 x4.   Psych:  Awake. Alert.  Normal affect.      Appropriate interactions.    Emergency Department Course     Imaging:  Radiology findings were communicated with the patient who voiced understanding of the findings.  US OB < 14 Weeks w Transvaginal  Single live intrauterine pregnancy 13 weeks 4 days  estimated gestational age. Ovaries are not visualized.  Reading per radiology: PRABHAKAR IQBAL MD    Laboratory:  Laboratory findings were communicated with the patient who voiced understanding of the findings.  UA with micro: Bacteria moderate (A), Ketones trace (A), squamous epithelial many (A), Blood small (A), mucous present (A) o/w negative   HCG Quantitative Pregnancy: 58436 (H)  Wet Prep: PMNs seen o/w negative   Rho immune globulin: Pending  Blood Type: O+  CBC: WBC 12.4 (H), HGB 11.0 (L) o/w WNL. ( )   Chlamydia Trachomatis PCR: pending  Neisseria Gonorrhea PCR: pending    Interventions:  1927 normal saline 1 L IV     Emergency Department Course:  Nursing notes and vitals reviewed.  1920 I performed an exam of the patient as documented above.  The patient received the above intervention(s).     The patient provided a urine sample here in the emergency department. This was sent for laboratory testing, findings above.  IV was inserted and blood was drawn for laboratory testing, results above.   The patient was sent for an ultrasound while in the emergency department, results above.   2150 the patient was rechecked and was updated on the results  of her laboratory and imaging studies.    I discussed the treatment plan with the patient. They expressed understanding of this plan and consented to discharge. They will be discharged home with instructions for care and follow up. In addition, the patient will return to the emergency department if their symptoms persist, worsen, if new symptoms arise or if there is any concern.  All questions were answered.    Impression & Plan      Medical Decision Making:  Kristi French is a 22 year old female who was seen and evaluated. The above work up was obtained and returned unremarkable. Her abdominal examination is benign and I doubt diverticulitis, appendicitis, there are no signs of pelvic inflammatory disease, ectopic pregnancy, or more concerning illness. I believe her pain is more closely related to her pregnancy. There are no signs of acute UTI or more concerning illness. I have recommended tylenol and stool softeners and to follow up with primary care physician. She should return if new or worsening symptoms.    Diagnosis:    ICD-10-CM   1. Abdominal pain affecting pregnancy O26.899    R10.9     Disposition:   The patient was discharged to home with the below medications to be taken at home as instructed.    Scribe Disclosure:  I, Zach Hugo, am serving as a scribe at 7:15 PM on 8/3/2017 to document services personally performed by Christiano Morocho MD, based on my observations and the provider's statements to me.  8/3/2017    EMERGENCY DEPARTMENT       Christiano Morocho MD  08/04/17 1400

## 2017-08-31 ENCOUNTER — RESULT FOLLOW UP (OUTPATIENT)
Dept: OBGYN | Facility: CLINIC | Age: 22
End: 2017-08-31

## 2017-08-31 ENCOUNTER — PRENATAL OFFICE VISIT (OUTPATIENT)
Dept: OBGYN | Facility: CLINIC | Age: 22
End: 2017-08-31
Payer: COMMERCIAL

## 2017-08-31 VITALS
WEIGHT: 125.4 LBS | BODY MASS INDEX: 23.68 KG/M2 | HEIGHT: 61 IN | DIASTOLIC BLOOD PRESSURE: 68 MMHG | SYSTOLIC BLOOD PRESSURE: 102 MMHG

## 2017-08-31 DIAGNOSIS — Z34.82 PRENATAL CARE, SUBSEQUENT PREGNANCY, SECOND TRIMESTER: Primary | ICD-10-CM

## 2017-08-31 DIAGNOSIS — R87.610 PAPANICOLAOU SMEAR OF CERVIX WITH ATYPICAL SQUAMOUS CELLS OF UNDETERMINED SIGNIFICANCE (ASC-US): ICD-10-CM

## 2017-08-31 PROBLEM — Z34.80 PRENATAL CARE, SUBSEQUENT PREGNANCY: Status: ACTIVE | Noted: 2017-08-31

## 2017-08-31 PROCEDURE — 99207 ZZC FIRST OB VISIT: CPT | Performed by: OBSTETRICS & GYNECOLOGY

## 2017-08-31 PROCEDURE — G0124 SCREEN C/V THIN LAYER BY MD: HCPCS | Performed by: OBSTETRICS & GYNECOLOGY

## 2017-08-31 PROCEDURE — G0145 SCR C/V CYTO,THINLAYER,RESCR: HCPCS | Performed by: OBSTETRICS & GYNECOLOGY

## 2017-08-31 NOTE — NURSING NOTE
"Chief Complaint   Patient presents with     Prenatal Care   NPN MD visit.17w2d  Having quad. Pt c/o vomiting.  Yamile Zambrano MA      Initial /68 (BP Location: Left arm, Patient Position: Chair, Cuff Size: Adult Regular)  Ht 5' 1\" (1.549 m)  Wt 125 lb 6.4 oz (56.9 kg)  LMP 05/02/2017  BMI 23.69 kg/m2 Estimated body mass index is 23.69 kg/(m^2) as calculated from the following:    Height as of this encounter: 5' 1\" (1.549 m).    Weight as of this encounter: 125 lb 6.4 oz (56.9 kg).  Medication Reconciliation: complete    "

## 2017-08-31 NOTE — PROGRESS NOTES
"SUBJECTIVE:  Kristi French is a 22 year old  P1, single, woman who presents for 1st. OB vist. Patient's last menstrual period was 2017.. Periods are regular q 28-30 days.   Current contraception: none        Past Medical History:   Diagnosis Date     No active medical problems        Past Surgical History:   Procedure Laterality Date     NO HISTORY OF SURGERY         Current Outpatient Prescriptions   Medication     Prenatal Vit-Fe Fumarate-FA (PRENATAL MULTIVITAMIN  PLUS IRON) 27-0.8 MG TABS per tablet     nitrofurantoin, macrocrystal-monohydrate, (MACROBID) 100 MG capsule     No current facility-administered medications for this visit.        No Known Allergies    Social History   Substance Use Topics     Smoking status: Never Smoker     Smokeless tobacco: Never Used     Alcohol use No         Review of Systems    CONSTITUTIONAL:NEGATIVE  EYES: NEGATIVE  ENT/MOUTH: NEGATIVE  RESP: NEGATIVE  CV: NEGATIVE  GI: NEGATIVE  : NEGATIVE  MUSCULOSKELATAL: NEGATIVE  INTEGUMENTARY/SKIN: NEGATIVE  BREAST: NEGATIVE  NEURO: NEGATIVE.        OBJECTIVE:  /68 (BP Location: Left arm, Patient Position: Chair, Cuff Size: Adult Regular)  Ht 5' 1\" (1.549 m)  Wt 125 lb 6.4 oz (56.9 kg)  LMP 2017  BMI 23.69 kg/m2  General appearance: Healthy.  Skin: Normal.  Mental Status: cooperative, normal affect, no gross thought process defects.  Thyroid: Normal to palpation, no enlargement or nodules noted.  Breasts: Symmetric without mass tenderness or discharge.  Axillary nodes negative.  Lungs: Clear to auscultation.   Heart.: Normal rate and rhythm.  No murmurs, clicks or gallops.   Abdomen: BS active. Soft, non-tender, no masses or organomegaly.    Pelvis: normal external genitalia, normal groin lymphatics, normal urethral meatus, normal vaginal mucosa, normal cervix, normal adnexa, no masses or tenderness, uterus 17 week  size .  FHR  160.   Extremities: Normal .       ASSESSMENT:  First OB vist    PLAN:    1) " Prenatal care  2) Discussed  genetic testing 1st trimester screen,  maternal serum screen;  CVS or ammniocentisis .

## 2017-08-31 NOTE — LETTER
September 12, 2017      Kristi French  [facility] [address]    Dear ,      This letter is in regards to your recent cervical cancer screening (Pap smear and HPV test). Your Pap smear result was reported as ASCUS or Atypical Squamous Cells of Undetermined Significance. This means that there were mildly abnormal cells found in the sample that we collected from your cervix, but no cancer cells were found. The vast majority of patients with this result do not have significant cervical abnormalities.     Therefore, current guidelines recommend that you return in one year to repeat your Pap smear.      If you have questions about these results contact [phone].    Sincerely,      Magdaleno Sims MD/leann

## 2017-08-31 NOTE — MR AVS SNAPSHOT
"              After Visit Summary   2017    Kristi French    MRN: [id number]           Patient Information     Date Of Birth          1995        Visit Information        Provider Department      2017 3:30 PM Magdaleno Sims MD [facility]        Today's Diagnoses     Prenatal care, subsequent pregnancy, second trimester    -  1       Follow-ups after your visit        Who to contact     If you have questions or need follow up information about today's clinic visit or your schedule please contact [facility] directly at [phone].  Normal or non-critical lab and imaging results will be communicated to you by MyChart, letter or phone within 4 business days after the clinic has received the results. If you do not hear from us within 7 days, please contact the clinic through [employer] or phone. If you have a critical or abnormal lab result, we will notify you by phone as soon as possible.  Submit refill requests through [employer] or call your pharmacy and they will forward the refill request to us. Please allow 3 business days for your refill to be completed.          Additional Information About Your Visit        MyChart Information     [employer] lets you send messages to your doctor, view your test results, renew your prescriptions, schedule appointments and more. To sign up, go to [email or web address] . Click on \"Log in\" on the left side of the screen, which will take you to the Welcome page. Then click on \"Sign up Now\" on the right side of the page.     You will be asked to enter the access code listed below, as well as some personal information. Please follow the directions to create your username and password.     Your access code is: [email or web address]  Expires: 10/17/2017 10:57 AM     Your access code will  in 90 days. If you need help or a new code, please call your [facility] or [phone].        Care EveryWhere ID     This is " "your Care EveryWhere ID. This could be used by other organizations to access your [facility] medical records  [id number]        Your Vitals Were     Height Last Period BMI (Body Mass Index)             5' 1\" (1.549 m) 05/02/2017 23.69 kg/m2          Blood Pressure from Last 3 Encounters:   08/31/17 102/68   08/03/17 96/64   08/03/17 90/62    Weight from Last 3 Encounters:   08/31/17 125 lb 6.4 oz (56.9 kg)   08/03/17 123 lb (55.8 kg)   08/03/17 123 lb 6.4 oz (56 kg)              We Performed the Following     Maternal quad screen     Pap imaged thin layer screen only - recommended age 21 - 24 years        Primary Care Provider Fax #    [place] [place] [facility] [phone]       [address]  [facility] [address]        Equal Access to Services     INDY RITCHIE : Hadii aad ku hadasho Soomaali, waaxda luqadaha, qaybta kaalmada adeegyada, waxay idiin hayaan andie booarakayla villalobos . So [facility] [phone].    ATENCIÓN: Si habla español, tiene a aguilar disposición servicios gratuitos de asistencia lingüística. Llame al [phone].    We comply with applicable federal civil rights laws and [place] laws. We do not discriminate on the basis of race, color, national origin, age, disability sex, sexual orientation or gender identity.            Thank you!     Thank you for choosing [facility]  for your care. Our goal is always to provide you with excellent care. Hearing back from our patients is one way we can continue to improve our services. Please take a few minutes to complete the written survey that you may receive in the mail after your visit with us. Thank you!             Your Updated Medication List - Protect others around you: Learn how to safely use, store and throw away your medicines at www.disposemymeds.org.          This list is accurate as of: 8/31/17  5:09 PM.  Always use your most recent med list.                   Brand Name Dispense Instructions for use Diagnosis    " nitroFURantoin (macrocrystal-monohydrate) 100 MG capsule    MACROBID    14 capsule    Take 1 capsule (100 mg) by mouth 2 times daily    Dysuria       prenatal multivitamin plus iron 27-0.8 MG Tabs per tablet      Take 1 tablet by mouth daily

## 2017-08-31 NOTE — LETTER
August 20, 2018      Kristi Clementivana  [address]  [facility] [address]    Dear ,      At [facility], your health and wellness is our primary concern. That is why we are following up on an abnormal pap from 8/31/17, which was reported as ASCUS. Your provider had recommended that you have a Pap smear completed by 8/31/18. Our records do not show that this has been scheduled.    It is important to complete the follow up that your provider has suggested for you to ensure that there are no worsening changes which may, over time, develop into cancer.      Please contact our office at  [phone] to schedule an appointment for a Pap smear at your earliest convenience. If you have questions or concerns, please call the clinic and we will be happy to assist you.    If you have completed the tests outside of [facility], please have the results forwarded to our office. We will update the chart for your primary Physician to review before your next annual physical.     Thank you for choosing [facility]!    Sincerely,      Magdaleno Sims MD/leann

## 2017-09-01 LAB
# FETUSES US: NORMAL
AFP ADJ MOM AMN: NORMAL
AFP SERPL-MCNC: NORMAL NG/ML
AGE - REPORTED: NORMAL
DATING METHOD: NORMAL
DIABETIC AT CONCEPTION: NO
FAMILY MEMBER DISEASES HX: NO
FAMILY MEMBER DISEASES HX: NORMAL
GA METHOD: NORMAL
GA: NORMAL WK
HCG MOM SERPL: NORMAL
HCG SERPL-ACNC: NORMAL M[IU]/ML
HX OF HEREDITARY DISORDERS: NO
IDDM PATIENT QL: NORMAL
INHIBIN A MOM SERPL: NORMAL
INHIBIN A SERPL-MCNC: NORMAL
INTEGRATED SCN PATIENT-IMP: NORMAL
LMP START DATE: NORMAL
PATHOLOGY STUDY: NORMAL
PREV HX CHROMOSOME ABNORMALITY: NORMAL
SPECIMEN DRAWN SERPL: NORMAL
TWINS: NORMAL
U ESTRIOL MOM SERPL: NORMAL
U ESTRIOL SERPL-MCNC: NORMAL NG/ML

## 2017-09-08 LAB
COPATH REPORT: ABNORMAL
PAP: ABNORMAL

## 2017-09-11 NOTE — PROGRESS NOTES
8/31/17 ASCUS in 22 yr old.  Plan: repeat pap only in 1 year  8/20/18 Pap reminder letter sent (rlm)  12/14/18 Pap Follow up reminder call placed, scheduled pt for 1/15/19 (rlm)  1/27/19 Patient is lost to follow-up. Routed to provider as FYI. (rlm)

## 2017-09-12 ENCOUNTER — PRENATAL OFFICE VISIT (OUTPATIENT)
Dept: OBGYN | Facility: CLINIC | Age: 22
End: 2017-09-12
Payer: COMMERCIAL

## 2017-09-12 VITALS — BODY MASS INDEX: 24.24 KG/M2 | WEIGHT: 128.3 LBS | DIASTOLIC BLOOD PRESSURE: 68 MMHG | SYSTOLIC BLOOD PRESSURE: 98 MMHG

## 2017-09-12 DIAGNOSIS — Z34.82 PRENATAL CARE, SUBSEQUENT PREGNANCY, SECOND TRIMESTER: Primary | ICD-10-CM

## 2017-09-12 PROCEDURE — 99207 ZZC PRENATAL VISIT: CPT | Performed by: OBSTETRICS & GYNECOLOGY

## 2017-09-12 NOTE — MR AVS SNAPSHOT
"              After Visit Summary   2017    Kristi French    MRN: [id number]           Patient Information     Date Of Birth          1995        Visit Information        Provider Department      2017 4:00 PM Magdaleno Sims MD [facility]        Today's Diagnoses     Prenatal care, subsequent pregnancy, second trimester    -  1       Follow-ups after your visit        Who to contact     If you have questions or need follow up information about today's clinic visit or your schedule please contact [facility] directly at [phone].  Normal or non-critical lab and imaging results will be communicated to you by MyChart, letter or phone within 4 business days after the clinic has received the results. If you do not hear from us within 7 days, please contact the clinic through Fox Technologies[employer] or phone. If you have a critical or abnormal lab result, we will notify you by phone as soon as possible.  Submit refill requests through Peer60 or call your pharmacy and they will forward the refill request to us. Please allow 3 business days for your refill to be completed.          Additional Information About Your Visit        MyChart Information     [email or web address] lets you send messages to your doctor, view your test results, renew your prescriptions, schedule appointments and more. To sign up, go to [email or web address] . Click on \"Log in\" on the left side of the screen, which will take you to the Welcome page. Then click on \"Sign up Now\" on the right side of the page.     You will be asked to enter the access code listed below, as well as some personal information. Please follow the directions to create your username and password.     Your access code is: [email or web address]  Expires: 10/17/2017 10:57 AM     Your access code will  in 90 days. If you need help or a new code, please call your [facility] or [phone].        Care EveryWhere ID     This is " your Care EveryWhere ID. This could be used by other organizations to access your [place] medical records  [id number]        Your Vitals Were     Last Period BMI (Body Mass Index)                05/02/2017 24.24 kg/m2           Blood Pressure from Last 3 Encounters:   09/12/17 98/68   08/31/17 102/68   08/03/17 96/64    Weight from Last 3 Encounters:   09/12/17 128 lb 4.8 oz (58.2 kg)   08/31/17 125 lb 6.4 oz (56.9 kg)   08/03/17 123 lb (55.8 kg)              Today, you had the following     No orders found for display       Primary Care Provider Fax #    [facility] [place] [facility] [phone]       [address] [place]  [facility] [address]        Equal Access to Services     INDY RITCHIE : Hadii aad ku hadasho Soomaali, waaxda luqadaha, qaybta kaalmada adeegyada, faye villalobos . So [facility] [phone].    ATENCIÓN: Si habla español, tiene a aguilar disposición servicios gratuitos de asistencia lingüística. Llame al [phone].    We comply with applicable federal civil rights laws and [place] laws. We do not discriminate on the basis of race, color, national origin, age, disability sex, sexual orientation or gender identity.            Thank you!     Thank you for choosing [facility]  for your care. Our goal is always to provide you with excellent care. Hearing back from our patients is one way we can continue to improve our services. Please take a few minutes to complete the written survey that you may receive in the mail after your visit with us. Thank you!             Your Updated Medication List - Protect others around you: Learn how to safely use, store and throw away your medicines at www.disposemymeds.org.          This list is accurate as of: 9/12/17 11:59 PM.  Always use your most recent med list.                   Brand Name Dispense Instructions for use Diagnosis    nitroFURantoin (macrocrystal-monohydrate) 100 MG capsule    MACROBID    14  capsule    Take 1 capsule (100 mg) by mouth 2 times daily    Dysuria       prenatal multivitamin plus iron 27-0.8 MG Tabs per tablet      Take 1 tablet by mouth daily

## 2017-09-12 NOTE — NURSING NOTE
"Chief Complaint   Patient presents with     Prenatal Care   19w0d  Yamile Zambrano MA      Initial BP 98/68 (BP Location: Right arm, Patient Position: Chair, Cuff Size: Adult Regular)  Wt 128 lb 4.8 oz (58.2 kg)  LMP 05/02/2017  BMI 24.24 kg/m2 Estimated body mass index is 24.24 kg/(m^2) as calculated from the following:    Height as of 8/31/17: 5' 1\" (1.549 m).    Weight as of this encounter: 128 lb 4.8 oz (58.2 kg).  Medication Reconciliation: complete    "

## 2017-09-14 ENCOUNTER — RADIANT APPOINTMENT (OUTPATIENT)
Dept: ULTRASOUND IMAGING | Facility: CLINIC | Age: 22
End: 2017-09-14
Payer: COMMERCIAL

## 2017-09-14 DIAGNOSIS — Z34.82 PRENATAL CARE, SUBSEQUENT PREGNANCY, SECOND TRIMESTER: ICD-10-CM

## 2017-09-14 PROCEDURE — 76815 OB US LIMITED FETUS(S): CPT | Performed by: OBSTETRICS & GYNECOLOGY

## 2017-09-21 ENCOUNTER — TELEPHONE (OUTPATIENT)
Dept: OBGYN | Facility: CLINIC | Age: 22
End: 2017-09-21

## 2017-09-21 DIAGNOSIS — Z34.82 PRENATAL CARE, SUBSEQUENT PREGNANCY, SECOND TRIMESTER: Primary | ICD-10-CM

## 2017-09-21 NOTE — TELEPHONE ENCOUNTER
Per notes:    Notes Recorded by Magdaleno Sims MD on 9/20/2017 at 5:57 PM  Please notify patient. Needs MFM referral for level II ultrasound intracardiac echogenic foci, 2 vessel cord, face and spine.      Spoke to pt and gave result.  Order added.    Shavon GARCÍA R.N.  [facility]

## 2017-09-25 ENCOUNTER — PRE VISIT (OUTPATIENT)
Dept: MATERNAL FETAL MEDICINE | Facility: CLINIC | Age: 22
End: 2017-09-25

## 2017-10-02 ENCOUNTER — HOSPITAL ENCOUNTER (OUTPATIENT)
Dept: ULTRASOUND IMAGING | Facility: CLINIC | Age: 22
Discharge: HOME | End: 2017-10-02
Attending: OBSTETRICS & GYNECOLOGY | Admitting: OBSTETRICS & GYNECOLOGY
Payer: COMMERCIAL

## 2017-10-02 ENCOUNTER — OFFICE VISIT (OUTPATIENT)
Dept: MATERNAL FETAL MEDICINE | Facility: CLINIC | Age: 22
End: 2017-10-02
Attending: OBSTETRICS & GYNECOLOGY
Payer: COMMERCIAL

## 2017-10-02 DIAGNOSIS — O26.90 PREGNANCY RELATED CONDITION, UNSPECIFIED TRIMESTER: ICD-10-CM

## 2017-10-02 DIAGNOSIS — O09.899 TWO VESSEL UMBILICAL CORD IN SINGLETON PREGNANCY, ANTEPARTUM: Primary | ICD-10-CM

## 2017-10-02 PROCEDURE — 76811 OB US DETAILED SNGL FETUS: CPT

## 2017-10-02 NOTE — PROGRESS NOTES
"Please see \"Imaging\" tab under \"Chart Review\" for details of today's visit.    Roxanne Machado    "

## 2017-10-02 NOTE — MR AVS SNAPSHOT
After Visit Summary   10/2/2017    Kristi French    MRN: [id number]           Patient Information     Date Of Birth          1995        Visit Information        Provider Department      10/2/2017 10:00 AM Roxanne Machado MD [facility] Maternal Fetal Medicine [facility]        Today's Diagnoses     Two vessel umbilical cord in anand pregnancy, antepartum    -  1       Follow-ups after your visit        Your next 10 appointments already scheduled     Oct 16, 2017  9:30 AM CDT   MFM READ SCREEN FETAL EHCO Anand with SHMFMUSR3   [facility] - [facility] ([facility])    [address] [place] [address]   [phone]            Oct 16, 2017 10:00 AM CDT   Radiology MD with ALESIA BAXTER MD   [facility] Maternal Fetal Medicine [facility] ([facility])    [address] [place] [address]   [phone]           Please arrive at the time given for your first appointment. This visit is used internally to schedule the physician's time during your ultrasound.            Nov 13, 2017  9:30 AM CST   MFM US COMPRE SINGLE F/U with SHMFMUSR2   [facility] Maternal Fetal Medicine Ultrasound - [facility] ([facility])    [address] [place] [address] [phone]           Wear comfortable clothes and leave your valuables at home.            Nov 13, 2017 10:00 AM CST   Radiology MD with ALESIA BAXTER MD   [facility] Maternal Fetal Medicine [facility] ([facility])    [address] [place] [address]   [phone]           Please arrive at the time given for your first appointment. This visit is used internally to schedule the physician's time during your ultrasound.              Future tests that were ordered for you today     Open Future Orders        Priority Expected Expires Ordered    MFM US Comprehensive Single F/U Routine  "2017 10/2/2018 10/2/2017    MF Read Screen Fetal Echo Single Routine 10/16/2017 2018 10/2/2017            Who to contact     If you have questions or need follow up information about today's clinic visit or your schedule please contact [facility] MATERNAL FETAL MEDICINE [facility] directly at [phone].  Normal or non-critical lab and imaging results will be communicated to you by Matches [employer]hart, letter or phone within 4 business days after the clinic has received the results. If you do not hear from us within 7 days, please contact the clinic through [employer] or phone. If you have a critical or abnormal lab result, we will notify you by phone as soon as possible.  Submit refill requests through [employer] or call your pharmacy and they will forward the refill request to us. Please allow 3 business days for your refill to be completed.          Additional Information About Your Visit        Matches Fashion[employer]SabrTech Information     Newmerix lets you send messages to your doctor, view your test results, renew your prescriptions, schedule appointments and more. To sign up, go to [email or web address] . Click on \"Log in\" on the left side of the screen, which will take you to the Welcome page. Then click on \"Sign up Now\" on the right side of the page.     You will be asked to enter the access code listed below, as well as some personal information. Please follow the directions to create your username and password.     Your access code is: [email or web address]  Expires: 10/17/2017 10:57 AM     Your access code will  in 90 days. If you need help or a new code, please call your [place] clinic or [phone].        Care EveryWhere ID     This is your Care EveryWhere ID. This could be used by other organizations to access your [place] medical records  [id number]        Your Vitals Were     Last Period                   2017            Blood Pressure from Last 3 Encounters:   17 98/68   17 102/68   17 96/64    " Weight from Last 3 Encounters:   09/12/17 58.2 kg (128 lb 4.8 oz)   08/31/17 56.9 kg (125 lb 6.4 oz)   08/03/17 55.8 kg (123 lb)               Primary Care Provider Office Phone # Fax #    [facility] [phone]       [address] [facility] [address]        Equal Access to Services     INDY RITCHIE : Hadii aad ku hadasho Soomaali, waaxda luqadaha, qaybta kaalmada adeegyada, waxay idiin hayaan adeeg kharakayla la'yazan . So [facility] [phone].    ATENCIÓN: Si habla español, tiene a aguilar disposición servicios gratuitos de asistencia lingüística. Llame al [phone].    We comply with applicable federal civil rights laws and [place] laws. We do not discriminate on the basis of race, color, national origin, age, disability, sex, sexual orientation, or gender identity.            Thank you!     Thank you for choosing [facility]  for your care. Our goal is always to provide you with excellent care. Hearing back from our patients is one way we can continue to improve our services. Please take a few minutes to complete the written survey that you may receive in the mail after your visit with us. Thank you!             Your Updated Medication List - Protect others around you: Learn how to safely use, store and throw away your medicines at www.disposemymeds.org.          This list is accurate as of: 10/2/17 11:55 AM.  Always use your most recent med list.                   Brand Name Dispense Instructions for use Diagnosis    nitroFURantoin (macrocrystal-monohydrate) 100 MG capsule    MACROBID    14 capsule    Take 1 capsule (100 mg) by mouth 2 times daily    Dysuria       prenatal multivitamin plus iron 27-0.8 MG Tabs per tablet      Take 1 tablet by mouth daily

## 2017-10-16 ENCOUNTER — OFFICE VISIT (OUTPATIENT)
Dept: MATERNAL FETAL MEDICINE | Facility: CLINIC | Age: 22
End: 2017-10-16
Attending: OBSTETRICS & GYNECOLOGY
Payer: COMMERCIAL

## 2017-10-16 ENCOUNTER — HOSPITAL ENCOUNTER (OUTPATIENT)
Dept: ULTRASOUND IMAGING | Facility: CLINIC | Age: 22
Discharge: HOME | End: 2017-10-16
Attending: OBSTETRICS & GYNECOLOGY | Admitting: OBSTETRICS & GYNECOLOGY
Payer: COMMERCIAL

## 2017-10-16 DIAGNOSIS — O36.8390 FETAL ARRHYTHMIA AFFECTING PREGNANCY, ANTEPARTUM: ICD-10-CM

## 2017-10-16 DIAGNOSIS — O09.899 TWO VESSEL UMBILICAL CORD IN SINGLETON PREGNANCY, ANTEPARTUM: ICD-10-CM

## 2017-10-16 DIAGNOSIS — O09.899 TWO VESSEL UMBILICAL CORD, ANTEPARTUM, SINGLE GESTATION: Primary | ICD-10-CM

## 2017-10-16 PROCEDURE — 93325 DOPPLER ECHO COLOR FLOW MAPG: CPT | Mod: 52

## 2017-10-16 NOTE — MR AVS SNAPSHOT
After Visit Summary   10/16/2017    Kristi French    MRN: [id number]           Patient Information     Date Of Birth          1995        Visit Information        Provider Department      10/16/2017 10:00 AM Liseth Devlin MD [facility] Maternal Fetal Medicine [facility]        Today's Diagnoses     Two vessel umbilical cord, antepartum, single gestation    -  1    Fetal arrhythmia affecting pregnancy, antepartum           Follow-ups after your visit        Your next 10 appointments already scheduled     Oct 18, 2017  2:45 PM CDT   SHORT with PARISH Lezama [facility]    [address]  [address]  [facility] [address]   [phone]            Nov 03, 2017 11:00 AM CDT   Ech Fetal Complete* with URFETR1   [facility] Echo/EKG ([facility])    [address]  [place] [address]               Nov 13, 2017  9:30 AM CST   MFM US COMPRE SINGLE F/U with SHMFMUSR2   [facility] Maternal Fetal Medicine Ultrasound [facility] ([facility])    [address] [place] [address]  [address]  [facility] [address] [phone]           Wear comfortable clothes and leave your valuables at home.            Nov 13, 2017 10:00 AM CST   Radiology MD with  VEE HILL   [facility] Maternal Fetal Medicine [facility] ([facility])    [address] [place] [address]  [address]  [facility] [address] [phone]           Please arrive at the time given for your first appointment. This visit is used internally to schedule the physician's time during your ultrasound.              Future tests that were ordered for you today     Open Future Orders        Priority Expected Expires Ordered    Echo Fetal Complete-Peds Cardiology Routine  10/16/2018 10/16/2017    Maternal Fetal Unable to Finish Exam single fetus Routine 10/16/2017 8/2/2018 10/2/2017            Who to contact     If you have questions or need  "follow up information about today's clinic visit or your schedule please contact [facility] MATERNAL FETAL MEDICINE [facility] directly at [phone].  Normal or non-critical lab and imaging results will be communicated to you by mSellerhart, letter or phone within 4 business days after the clinic has received the results. If you do not hear from us within 7 days, please contact the clinic through mSellerhart or phone. If you have a critical or abnormal lab result, we will notify you by phone as soon as possible.  Submit refill requests through [employer] or call your pharmacy and they will forward the refill request to us. Please allow 3 business days for your refill to be completed.          Additional Information About Your Visit        mSellerharFortus Medical Information     [employer] lets you send messages to your doctor, view your test results, renew your prescriptions, schedule appointments and more. To sign up, go to [email or web address] . Click on \"Log in\" on the left side of the screen, which will take you to the Welcome page. Then click on \"Sign up Now\" on the right side of the page.     You will be asked to enter the access code listed below, as well as some personal information. Please follow the directions to create your username and password.     Your access code is: [email or web address]  Expires: 10/17/2017 10:57 AM     Your access code will  in 90 days. If you need help or a new code, please call your [place] clinic or [phone].        Care EveryWhere ID     This is your Care EveryWhere ID. This could be used by other organizations to access your [place] medical records  [id number]        Your Vitals Were     Last Period                   2017            Blood Pressure from Last 3 Encounters:   17 98/68   17 102/68   17 96/64    Weight from Last 3 Encounters:   17 58.2 kg (128 lb 4.8 oz)   17 56.9 kg (125 lb 6.4 oz)   17 55.8 kg (123 lb)               Primary Care Provider " Office Phone # Fax #    [email or web address] [phone] [address] [facility] [address]        Equal Access to Services     INDY RITCHIE : Hadii aad ku hadadebayoo Soomaali, waaxda luqadaha, qaybta kaalmada adeegyada, faye boylen andie andino laHuseyinyazan duffy. So [facility] [phone].    ATENCIÓN: Si habla español, tiene a aguilar disposición servicios gratuitos de asistencia lingüística. Llame al [phone].    We comply with applicable federal civil rights laws and [place] laws. We do not discriminate on the basis of race, color, national origin, age, disability, sex, sexual orientation, or gender identity.            Thank you!     Thank you for choosing [facility]  for your care. Our goal is always to provide you with excellent care. Hearing back from our patients is one way we can continue to improve our services. Please take a few minutes to complete the written survey that you may receive in the mail after your visit with us. Thank you!             Your Updated Medication List - Protect others around you: Learn how to safely use, store and throw away your medicines at www.disposemymeds.org.          This list is accurate as of: 10/16/17 10:44 AM.  Always use your most recent med list.                   Brand Name Dispense Instructions for use Diagnosis    nitroFURantoin (macrocrystal-monohydrate) 100 MG capsule    MACROBID    14 capsule    Take 1 capsule (100 mg) by mouth 2 times daily    Dysuria       prenatal multivitamin plus iron 27-0.8 MG Tabs per tablet      Take 1 tablet by mouth daily

## 2017-10-16 NOTE — PROGRESS NOTES
Please see ultrasound report under imaging tab for details on ultrasound performed today.    Liseth Devlin MD  , OB/GYN  Maternal-Fetal Medicine  [email or web address]  [phone] (Academic office)  [phone] (Pager)

## 2017-10-29 ENCOUNTER — NURSE TRIAGE (OUTPATIENT)
Dept: NURSING | Facility: CLINIC | Age: 22
End: 2017-10-29

## 2017-10-29 NOTE — TELEPHONE ENCOUNTER
Reason for Disposition    [1] Pain radiates into the thigh or further down the leg AND [2] both legs    Additional Information    Negative: Shock suspected (e.g., cold/pale/clammy skin, too weak to stand, low BP, rapid pulse)    Negative: SEVERE abdominal pain    Negative: Sounds like a life-threatening emergency to the triager    Negative: Major injury to the back (e.g., MVA, fall > 10 feet or 3 meters, penetrating injury, etc.)    Negative: Followed a tailbone injury     Pain is 4 to 7, at the worst.    Negative: [1] Having contractions or other symptoms of labor AND [2] >= 37 weeks pregnant (i.e., term pregnancy)    Negative: [1] Having contractions or other symptoms of labor AND [2] < 37 weeks pregnant (i.e., )    Negative: [1] Abdominal pain AND [2] pregnant > 20 weeks    Negative: [1] Abdominal pain AND [2] pregnant < 20 weeks    Negative: [1] Pain in the upper back AND [2] overlies the rib cage    Negative: [1] Pain in the upper back AND [2] worsened by coughing (or clearly increases with breathing)    Negative: [1] Unable to urinate (or only a few drops) > 4 hours AND     [2] bladder feels very full (e.g., palpable bladder or strong urge to urinate)    Negative: Numbness in groin or rectal area (i.e., loss of sensation)    Negative: Leakage of fluid from vagina    Negative: [1] Pregnant 23 or more weeks AND [2] baby is moving less today (e.g., kick count < 5 in 1 hour or < 10 in 2 hours)    Negative: Weakness of a leg or foot (e.g., unable to bear weight, dragging foot)    Negative: Unable to walk    Negative: Patient sounds very sick or weak to the triager    Negative: [1] SEVERE back pain (e.g., excruciating, unable to do any normal activities) AND [2] not improved 2 hours after pain medicine    Protocols used: PREGNANCY - BACK PAIN-ADULT-    Patient states her back pain is in the lower back. She does have some pain that goes into her legs. Sitting in a warm tub does help with the pain. She also  has some constipation with being on iron for her pregnancy. She stated she has blood on the toilet tissue with wiping.  Thea Avila RN-[facility]

## 2017-11-13 ENCOUNTER — HOSPITAL ENCOUNTER (OUTPATIENT)
Dept: ULTRASOUND IMAGING | Facility: CLINIC | Age: 22
Discharge: HOME | End: 2017-11-13
Attending: OBSTETRICS & GYNECOLOGY | Admitting: OBSTETRICS & GYNECOLOGY
Payer: COMMERCIAL

## 2017-11-13 ENCOUNTER — OFFICE VISIT (OUTPATIENT)
Dept: MATERNAL FETAL MEDICINE | Facility: CLINIC | Age: 22
End: 2017-11-13
Attending: OBSTETRICS & GYNECOLOGY
Payer: COMMERCIAL

## 2017-11-13 DIAGNOSIS — O09.899 TWO VESSEL UMBILICAL CORD IN SINGLETON PREGNANCY, ANTEPARTUM: ICD-10-CM

## 2017-11-13 DIAGNOSIS — O09.899 TWO VESSEL UMBILICAL CORD IN SINGLETON PREGNANCY, ANTEPARTUM: Primary | ICD-10-CM

## 2017-11-13 PROCEDURE — 76816 OB US FOLLOW-UP PER FETUS: CPT

## 2017-11-13 NOTE — PROGRESS NOTES
Please see ultrasound report under imaging tab for details on ultrasound performed today.    Liseth Devlin MD  , OB/GYN  Maternal-Fetal Medicine  [email or web address]  [phone] (Academic office)  [phone] (Pager)

## 2017-12-22 ENCOUNTER — HOSPITAL ENCOUNTER (OUTPATIENT)
Dept: CARDIOLOGY | Facility: CLINIC | Age: 22
End: 2017-12-22
Attending: SOCIAL WORKER
Payer: COMMERCIAL

## 2017-12-22 ENCOUNTER — HOSPITAL ENCOUNTER (OUTPATIENT)
Dept: ULTRASOUND IMAGING | Facility: CLINIC | Age: 22
Discharge: HOME | End: 2017-12-22
Attending: OBSTETRICS & GYNECOLOGY | Admitting: OBSTETRICS & GYNECOLOGY
Payer: COMMERCIAL

## 2017-12-22 ENCOUNTER — OFFICE VISIT (OUTPATIENT)
Dept: MATERNAL FETAL MEDICINE | Facility: CLINIC | Age: 22
End: 2017-12-22
Attending: OBSTETRICS & GYNECOLOGY
Payer: COMMERCIAL

## 2017-12-22 VITALS — SYSTOLIC BLOOD PRESSURE: 105 MMHG | DIASTOLIC BLOOD PRESSURE: 67 MMHG

## 2017-12-22 DIAGNOSIS — O09.899 TWO VESSEL UMBILICAL CORD IN SINGLETON PREGNANCY, ANTEPARTUM: ICD-10-CM

## 2017-12-22 DIAGNOSIS — O09.899 TWO VESSEL UMBILICAL CORD, ANTEPARTUM, SINGLE GESTATION: Primary | ICD-10-CM

## 2017-12-22 DIAGNOSIS — O09.899 TWO VESSEL UMBILICAL CORD, ANTEPARTUM, SINGLE GESTATION: ICD-10-CM

## 2017-12-22 PROCEDURE — 76816 OB US FOLLOW-UP PER FETUS: CPT

## 2017-12-22 PROCEDURE — 76825 ECHO EXAM OF FETAL HEART: CPT

## 2017-12-22 NOTE — MR AVS SNAPSHOT
"              After Visit Summary   2017    Kristi French    MRN: [id number]           Patient Information     Date Of Birth          1995        Visit Information        Provider Department      2017 8:30 AM Aleida Simms MD [facility]        Today's Diagnoses     Two vessel umbilical cord, antepartum, single gestation    -  1       Follow-ups after your visit        Who to contact     If you have questions or need follow up information about today's clinic visit or your schedule please contact [facility]ERNAL [facility]ETAL [facility]IC[facility] directly at [phone].  Normal or non-critical lab and imaging results will be communicated to you by [employer] [place] Feethart, letter or phone within 4 business days after the clinic has received the results. If you do not hear from us within 7 days, please contact the clinic through [employer] Feethart or phone. If you have a critical or abnormal lab result, we will notify you by phone as soon as possible.  Submit refill requests through Unda or call your pharmacy and they will forward the refill request to us. Please allow 3 business days for your refill to be completed.          Additional Information About Your Visit        MyChart Information     Unda lets you send messages to your doctor, view your test results, renew your prescriptions, schedule appointments and more. To sign up, go to [email or web address] . Click on \"Log in\" on the left side of the screen, which will take you to the Welcome page. Then click on \"Sign up Now\" on the right side of the page.     You will be asked to enter the access code listed below, as well as some personal information. Please follow the directions to create your username and password.     Your access code is: [email or web address]  Expires: 2018 12:31 PM     Your access code will  in 90 days. If you need help or a new code, please call your [place] clinic or [phone].        Care " EveryWhere ID     This is your Care EveryWhere ID. This could be used by other organizations to access your [place] medical records  [id number]        Your Vitals Were     Last Period                   05/02/2017            Blood Pressure from Last 3 Encounters:   12/22/17 105/67   09/12/17 98/68   08/31/17 102/68    Weight from Last 3 Encounters:   09/12/17 128 lb 4.8 oz (58.2 kg)   08/31/17 125 lb 6.4 oz (56.9 kg)   08/03/17 123 lb (55.8 kg)              Today, you had the following     No orders found for display       Primary Care Provider Office Phone # Fax #    [place] [facility] [phone] [address] [facility] [address]        Equal Access to Services     INDY RITCHIE : Hadii aad ku hadasho Soomaali, waaxda luqadaha, qaybta kaalmada adeegyada, faye villalobos . So [facility] [phone].    ATENCIÓN: Si habla español, tiene a aguilar disposición servicios gratuitos de asistencia lingüística. Aristeo al [phone].    We comply with applicable federal civil rights laws and [place] laws. We do not discriminate on the basis of race, color, national origin, age, disability, sex, sexual orientation, or gender identity.            Thank you!     Thank you for choosing [facility]  for your care. Our goal is always to provide you with excellent care. Hearing back from our patients is one way we can continue to improve our services. Please take a few minutes to complete the written survey that you may receive in the mail after your visit with us. Thank you!             Your Updated Medication List - Protect others around you: Learn how to safely use, store and throw away your medicines at www.disposemymeds.org.          This list is accurate as of: 12/22/17  9:15 AM.  Always use your most recent med list.                   Brand Name Dispense Instructions for use Diagnosis    nitroFURantoin  (macrocrystal-monohydrate) 100 MG capsule    MACROBID    14 capsule    Take 1 capsule (100 mg) by mouth 2 times daily    Dysuria       prenatal multivitamin plus iron 27-0.8 MG Tabs per tablet      Take 1 tablet by mouth daily

## 2017-12-22 NOTE — PROGRESS NOTES
Please see the imaging tab for details of the ultrasound performed today.    Aleida Simms MD  Specialist in Maternal-Fetal Medicine

## 2017-12-25 ENCOUNTER — NURSE TRIAGE (OUTPATIENT)
Dept: NURSING | Facility: CLINIC | Age: 22
End: 2017-12-25

## 2017-12-26 NOTE — TELEPHONE ENCOUNTER
Reason for Disposition    [1] Pregnant 23 or more weeks AND [2] baby is moving less today (e.g., kick count < 5 in 1 hour or < 10 in 2 hours)    Additional Information    Negative: Passed out (i.e., lost consciousness, collapsed and was not responding)    Negative: Shock suspected (e.g., cold/pale/clammy skin, too weak to stand, low BP, rapid pulse)    Negative: Sounds like a life-threatening emergency to the triager    Negative: Major injury to the back (e.g., MVA, fall > 10 feet or 3 meters, penetrating injury, etc.)    Negative: Followed a tailbone injury    Negative: [1] Pain in the upper back over the ribs (rib cage) AND [2] radiates (travels, goes) into chest    Negative: [1] Pain in the upper back over the ribs (rib cage) AND [2] worsened by coughing (or clearly increases with breathing)    Back pain during pregnancy    Negative: Shock suspected (e.g., cold/pale/clammy skin, too weak to stand, low BP, rapid pulse)    Negative: SEVERE abdominal pain    Negative: Sounds like a life-threatening emergency to the triager    Negative: Major injury to the back (e.g., MVA, fall > 10 feet or 3 meters, penetrating injury, etc.)    Negative: Followed a tailbone injury    Negative: [1] Having contractions or other symptoms of labor AND [2] >= 37 weeks pregnant (i.e., term pregnancy)    Negative: [1] Having contractions or other symptoms of labor AND [2] < 37 weeks pregnant (i.e., )    Negative: [1] Abdominal pain AND [2] pregnant > 20 weeks    Negative: [1] Abdominal pain AND [2] pregnant < 20 weeks    Negative: [1] Pain in the upper back AND [2] overlies the rib cage    Negative: [1] Pain in the upper back AND [2] worsened by coughing (or clearly increases with breathing)    Negative: [1] Unable to urinate (or only a few drops) > 4 hours AND     [2] bladder feels very full (e.g., palpable bladder or strong urge to urinate)    Negative: Numbness in groin or rectal area (i.e., loss of sensation)    Negative:  Leakage of fluid from vagina    Protocols used: PREGNANCY - BACK PAIN-ADULT-AH, BACK PAIN-ADULT-AH    Pt. Calls and says that she is 33w6d pregnant and has had lower back pain all day today. Pt. Does not think she has felt the baby move a lot today. On-call Dr. JOSUE Justice OB/GYN-Was paged, to call pt., at: [phone], at: [phone], via page .

## 2018-01-02 ENCOUNTER — PRENATAL OFFICE VISIT (OUTPATIENT)
Dept: OBGYN | Facility: CLINIC | Age: 23
End: 2018-01-02
Payer: COMMERCIAL

## 2018-01-02 VITALS
HEART RATE: 84 BPM | WEIGHT: 148.2 LBS | SYSTOLIC BLOOD PRESSURE: 104 MMHG | HEIGHT: 61 IN | BODY MASS INDEX: 27.98 KG/M2 | DIASTOLIC BLOOD PRESSURE: 70 MMHG

## 2018-01-02 DIAGNOSIS — O09.90 SUPERVISION OF HIGH RISK PREGNANCY, ANTEPARTUM: Primary | ICD-10-CM

## 2018-01-02 DIAGNOSIS — O36.8390 FETAL ARRHYTHMIA AFFECTING PREGNANCY, ANTEPARTUM: ICD-10-CM

## 2018-01-02 DIAGNOSIS — Z34.80 PRENATAL CARE OF MULTIGRAVIDA, ANTEPARTUM: ICD-10-CM

## 2018-01-02 PROCEDURE — 90471 IMMUNIZATION ADMIN: CPT | Performed by: OBSTETRICS & GYNECOLOGY

## 2018-01-02 PROCEDURE — 87653 STREP B DNA AMP PROBE: CPT | Performed by: OBSTETRICS & GYNECOLOGY

## 2018-01-02 PROCEDURE — 99207 ZZC PRENATAL VISIT: CPT | Performed by: OBSTETRICS & GYNECOLOGY

## 2018-01-02 PROCEDURE — 90715 TDAP VACCINE 7 YRS/> IM: CPT | Performed by: OBSTETRICS & GYNECOLOGY

## 2018-01-02 NOTE — PROGRESS NOTES
"Routine OB Visit:    S: Patient reports feeling well. Denies contractions. Good fetal movement. No LoF, VB. Unsure where she would like to deliver. Encouraged [facility] as that is the only hospital our group covers for delivery care. She is considering [facility] and [facility].    O: /70 (BP Location: Right arm, Patient Position: Chair, Cuff Size: Adult Regular)  Pulse 84  Ht 5' 1\" (1.549 m)  Wt 148 lb 3.2 oz (67.2 kg)  LMP 2017  BMI 28 kg/m2   Gen: resting comfortably, in NAD  Doptone: 140s with fetal arrhythmia noted, including dropped beats ever 5-10 beats.  See Prenatal flowsheet    A: Kristi French is a 22 year old female  at 35w0d, here for routine OB care. Pregnancy c/b scant prenatal care, 2VC, and arrhythmia.    P:   1) 2VC: growth 50th%ile, ELIZABETH within normal limits. Plans repeat MFM US in 3w for continued monitoring  2) arrhythmia noted on exam today, plan to return this week for MFM evaluation  3) Delivery plan: encouraged her to establish care with a group she plans to deliver with. Encouraged her to deliver at [facility] as her care to this point has been in our system.  4) GBS collected today. Tdap today.    Return to clinic in 1 week.       Jennifer Justin MD  Obstetrics and Gynecology  2018     "

## 2018-01-02 NOTE — MR AVS SNAPSHOT
After Visit Summary   1/2/2018    Kristi French    MRN: [id number]           Patient Information     Date Of Birth          1995        Visit Information        Provider Department      1/2/2018 4:15 PM Jennifer Justin MD [facility]        Today's Diagnoses     Supervision of high risk pregnancy, antepartum    -  1    Prenatal care of multigravida, antepartum        Fetal arrhythmia affecting pregnancy, antepartum           Follow-ups after your visit        Additional Services     MAT FETAL MED CTR REFERRAL-PREGNANCY       >> Patient may proceed with recommendations for further testing as directed by the Maternal Fetal Medicine Specialist >>    >> If requesting Fetal Echo: MFM will determine appropriate location for exam due to indication.    >> If requesting Lung Maturity Amnio:  If results indicate fetal lung maturity, induction or C/S is recommended within 36 hours.  Please schedule accordingly.     Dear Patient:   Please be aware that coverage of these services is subject to the terms and limitations of your health insurance plan.  Call member services at your health plan with any benefit or coverage questions.      Please bring the following to your appointment:    >>  Any x-rays, CTs or MRIs which have been performed.  Contact the facility where they were done to arrange for  prior to your scheduled appointment.  Any new CT, MRI or other procedures ordered by your specialist must be performed at a [place] facility or coordinated by your clinic's referral office.  >>  List of current medications   >>  This referral request   >>  Any documents/labs given to you for this referral                  Your next 10 appointments already scheduled     Jan 03, 2018 11:00 AM CST   Nurse Only with [facility] OB - NURSE   [facility]    [address] [address] [phone]             "  Future tests that were ordered for you today     Open Future Orders        Priority Expected Expires Ordered    Glucose tolerance gest screen 1 hour Routine 1/3/2018 2019 2018    Anti Treponema Routine 1/3/2018 2019 2018    CBC with platelets Routine 1/3/2018 2019 2018            Who to contact     If you have questions or need follow up information about today's clinic visit or your schedule please contact [facility] directly at [phone].  Normal or non-critical lab and imaging results will be communicated to you by [employer] Residenceshart, letter or phone within 4 business days after the clinic has received the results. If you do not hear from us within 7 days, please contact the clinic through [employer] Residenceshart or phone. If you have a critical or abnormal lab result, we will notify you by phone as soon as possible.  Submit refill requests through Tauntr or call your pharmacy and they will forward the refill request to us. Please allow 3 business days for your refill to be completed.          Additional Information About Your Visit        [facility] Information     Tauntr lets you send messages to your doctor, view your test results, renew your prescriptions, schedule appointments and more. To sign up, go to [email or web address] . Click on \"Log in\" on the left side of the screen, which will take you to the Welcome page. Then click on \"Sign up Now\" on the right side of the page.     You will be asked to enter the access code listed below, as well as some personal information. Please follow the directions to create your username and password.     Your access code is: [email or web address]  Expires: 2018 12:31 PM     Your access code will  in 90 days. If you need help or a new code, please call your [facility] or [phone].        Care EveryWhere ID     This is your Care EveryWhere ID. This could be used by other organizations to access your [place] medical " "records  [id number]        Your Vitals Were     Pulse Height Last Period BMI (Body Mass Index)          84 5' 1\" (1.549 m) 05/02/2017 28 kg/m2         Blood Pressure from Last 3 Encounters:   01/02/18 104/70   12/22/17 105/67   09/12/17 98/68    Weight from Last 3 Encounters:   01/02/18 148 lb 3.2 oz (67.2 kg)   09/12/17 128 lb 4.8 oz (58.2 kg)   08/31/17 125 lb 6.4 oz (56.9 kg)              We Performed the Following     Group B strep PCR     MAT FETAL MED CTR REFERRAL-PREGNANCY     TDAP VACCINE (ADACEL)        Primary Care Provider Office Phone # Fax #    [facility] [phone] [address] [facility] [address]        Equal Access to Services     INDY RITCHIE : Hadii ruth ku hadasho Somarianali, waaxda luqadaha, qaybta kaalmada adeegyada, faye carter hayyazan villalobos . So [facility] [phone].    ATENCIÓN: Si habla español, tiene a aguilar disposición servicios gratuitos de asistencia lingüística. Llame al [phone].    We comply with applicable federal civil rights laws and [place] laws. We do not discriminate on the basis of race, color, national origin, age, disability, sex, sexual orientation, or gender identity.            Thank you!     Thank you for choosing [facility]  for your care. Our goal is always to provide you with excellent care. Hearing back from our patients is one way we can continue to improve our services. Please take a few minutes to complete the written survey that you may receive in the mail after your visit with us. Thank you!             Your Updated Medication List - Protect others around you: Learn how to safely use, store and throw away your medicines at www.disposemymeds.org.          This list is accurate as of: 1/2/18 11:59 PM.  Always use your most recent med list.                   Brand Name Dispense Instructions for use Diagnosis    prenatal multivitamin plus iron 27-0.8 MG Tabs per " tablet      Take 1 tablet by mouth daily

## 2018-01-02 NOTE — NURSING NOTE
"Chief Complaint   Patient presents with     Prenatal Care     35 weeks 0 days, due for GTT and labs, patient will have tdap today.       Initial /70 (BP Location: Right arm, Patient Position: Chair, Cuff Size: Adult Regular)  Pulse 84  Ht 5' 1\" (1.549 m)  Wt 148 lb 3.2 oz (67.2 kg)  LMP 05/02/2017  BMI 28 kg/m2 Estimated body mass index is 28 kg/(m^2) as calculated from the following:    Height as of this encounter: 5' 1\" (1.549 m).    Weight as of this encounter: 148 lb 3.2 oz (67.2 kg).  Medication Reconciliation: complete     Scott Red CMA      "

## 2018-01-03 ENCOUNTER — TELEPHONE (OUTPATIENT)
Dept: OBGYN | Facility: CLINIC | Age: 23
End: 2018-01-03

## 2018-01-03 ENCOUNTER — ALLIED HEALTH/NURSE VISIT (OUTPATIENT)
Dept: NURSING | Facility: CLINIC | Age: 23
End: 2018-01-03
Payer: COMMERCIAL

## 2018-01-03 VITALS — WEIGHT: 150.6 LBS | BODY MASS INDEX: 28.46 KG/M2

## 2018-01-03 DIAGNOSIS — O36.8390 FETAL ARRHYTHMIA AFFECTING PREGNANCY, ANTEPARTUM: Primary | ICD-10-CM

## 2018-01-03 DIAGNOSIS — D64.9 ANEMIA: Primary | ICD-10-CM

## 2018-01-03 DIAGNOSIS — Z34.80 PRENATAL CARE OF MULTIGRAVIDA, ANTEPARTUM: ICD-10-CM

## 2018-01-03 DIAGNOSIS — O09.90 SUPERVISION OF HIGH RISK PREGNANCY, ANTEPARTUM: ICD-10-CM

## 2018-01-03 LAB
ERYTHROCYTE [DISTWIDTH] IN BLOOD BY AUTOMATED COUNT: 16.4 % (ref 10–15)
GLUCOSE 1H P 50 G GLC PO SERPL-MCNC: 83 MG/DL (ref 60–129)
GP B STREP DNA SPEC QL NAA+PROBE: NEGATIVE
HCT VFR BLD AUTO: 24.3 % (ref 35–47)
HGB BLD-MCNC: 7.5 G/DL (ref 11.7–15.7)
MCH RBC QN AUTO: 24.4 PG (ref 26.5–33)
MCHC RBC AUTO-ENTMCNC: 30.9 G/DL (ref 31.5–36.5)
MCV RBC AUTO: 79 FL (ref 78–100)
PLATELET # BLD AUTO: 283 10E9/L (ref 150–450)
RBC # BLD AUTO: 3.08 10E12/L (ref 3.8–5.2)
SPECIMEN SOURCE: NORMAL
WBC # BLD AUTO: 10.9 10E9/L (ref 4–11)

## 2018-01-03 PROCEDURE — 82950 GLUCOSE TEST: CPT | Performed by: OBSTETRICS & GYNECOLOGY

## 2018-01-03 PROCEDURE — 86780 TREPONEMA PALLIDUM: CPT | Performed by: OBSTETRICS & GYNECOLOGY

## 2018-01-03 PROCEDURE — 99207 ZZC NO CHARGE NURSE ONLY: CPT

## 2018-01-03 PROCEDURE — 85027 COMPLETE CBC AUTOMATED: CPT | Performed by: OBSTETRICS & GYNECOLOGY

## 2018-01-03 PROCEDURE — 36415 COLL VENOUS BLD VENIPUNCTURE: CPT | Performed by: OBSTETRICS & GYNECOLOGY

## 2018-01-03 NOTE — TELEPHONE ENCOUNTER
Please contact patient with results and ask her to return for repeat hgb tomorrow with feretin level as well. She should be reminded to take her po iron twice daily. If repeat Hgb is low I'd like to have her set up for iron infusion.    Jennifer Justin MD

## 2018-01-03 NOTE — TELEPHONE ENCOUNTER
Lab called to report to me. Pt had critical lab value of hgb today of 7.5.  Pt has left the clinic.  Please advise.    Shavon GARCÍA R.N.  [facility]

## 2018-01-04 DIAGNOSIS — D64.9 ANEMIA: ICD-10-CM

## 2018-01-04 LAB
FERRITIN SERPL-MCNC: 3 NG/ML (ref 12–150)
HGB BLD-MCNC: 7.9 G/DL (ref 11.7–15.7)
T PALLIDUM IGG+IGM SER QL: NEGATIVE

## 2018-01-04 PROCEDURE — 82728 ASSAY OF FERRITIN: CPT | Performed by: OBSTETRICS & GYNECOLOGY

## 2018-01-04 PROCEDURE — 85018 HEMOGLOBIN: CPT | Performed by: OBSTETRICS & GYNECOLOGY

## 2018-01-04 PROCEDURE — 36415 COLL VENOUS BLD VENIPUNCTURE: CPT | Performed by: OBSTETRICS & GYNECOLOGY

## 2018-01-04 NOTE — TELEPHONE ENCOUNTER
Order placed. Spoke to patient, but it was not a good time and she said she would call back in a little while.      Feli Blas RN

## 2018-01-04 NOTE — TELEPHONE ENCOUNTER
Spoke with pt, she will come this afternoon (4:45pm).  Gave her all info as listed below by Dr Justin.    Shavon GARCÍA R.N.  [facility]

## 2018-01-05 PROBLEM — D64.9 ANEMIA: Status: ACTIVE | Noted: 2018-01-05

## 2018-01-05 NOTE — TELEPHONE ENCOUNTER
Please call patient with her lab results and set her up for iron infusion. Id also like her to have a bpp next week to monitor baby in the setting of anemia.       Jennifer Justin MD    We need to enter mg amount for iron infusion order.(?)    Shavon GARCÍA R.N.  [facility]

## 2018-01-05 NOTE — PROGRESS NOTES
Please call patient with her lab results and set her up for iron infusion. Id also like her to have a bpp next week to monitor baby in the setting of anemia.     Jennifer Justin MD

## 2018-01-05 NOTE — TELEPHONE ENCOUNTER
Spoke to Dr Justin.      We went with iron sucrose 300mg for three visits every other day.  (routed as fyi)    Spoke to pt and she will schedule these as well as BPP.      I kept this message to check back next week to make sure everything got schedule.    Shavon GARCÍA R.N.  [facility]

## 2018-01-08 NOTE — TELEPHONE ENCOUNTER
I checked and pt has all three infusion appts scheduled as well as BPP.  Shavon GARCÍA R.N.  [facility]

## 2018-01-10 ENCOUNTER — HOSPITAL ENCOUNTER (OUTPATIENT)
Dept: ULTRASOUND IMAGING | Facility: CLINIC | Age: 23
Discharge: HOME | End: 2018-01-10
Attending: OBSTETRICS & GYNECOLOGY | Admitting: OBSTETRICS & GYNECOLOGY
Payer: COMMERCIAL

## 2018-01-10 ENCOUNTER — INFUSION THERAPY VISIT (OUTPATIENT)
Dept: INFUSION THERAPY | Facility: CLINIC | Age: 23
End: 2018-01-10
Attending: OBSTETRICS & GYNECOLOGY
Payer: COMMERCIAL

## 2018-01-10 ENCOUNTER — OFFICE VISIT (OUTPATIENT)
Dept: MATERNAL FETAL MEDICINE | Facility: CLINIC | Age: 23
End: 2018-01-10
Attending: OBSTETRICS & GYNECOLOGY
Payer: COMMERCIAL

## 2018-01-10 VITALS
RESPIRATION RATE: 16 BRPM | HEART RATE: 96 BPM | TEMPERATURE: 98.2 F | SYSTOLIC BLOOD PRESSURE: 103 MMHG | DIASTOLIC BLOOD PRESSURE: 69 MMHG

## 2018-01-10 DIAGNOSIS — O36.8390 FETAL ARRHYTHMIA AFFECTING PREGNANCY, ANTEPARTUM: ICD-10-CM

## 2018-01-10 DIAGNOSIS — D50.8 OTHER IRON DEFICIENCY ANEMIA: Primary | ICD-10-CM

## 2018-01-10 DIAGNOSIS — O09.90 SUPERVISION OF HIGH RISK PREGNANCY, ANTEPARTUM: ICD-10-CM

## 2018-01-10 DIAGNOSIS — O36.8390 FETAL ARRHYTHMIA AFFECTING PREGNANCY, ANTEPARTUM: Primary | ICD-10-CM

## 2018-01-10 PROCEDURE — 96366 THER/PROPH/DIAG IV INF ADDON: CPT

## 2018-01-10 PROCEDURE — 76816 OB US FOLLOW-UP PER FETUS: CPT

## 2018-01-10 PROCEDURE — 96365 THER/PROPH/DIAG IV INF INIT: CPT

## 2018-01-10 PROCEDURE — 25000128 H RX IP 250 OP 636: Performed by: OBSTETRICS & GYNECOLOGY

## 2018-01-10 RX ADMIN — IRON SUCROSE 300 MG: 20 INJECTION, SOLUTION INTRAVENOUS at 14:26

## 2018-01-10 NOTE — MR AVS SNAPSHOT
After Visit Summary   1/10/2018    Kristi French    MRN: [id number]           Patient Information     Date Of Birth          1995        Visit Information        Provider Department      1/10/2018 2:00 PM RH INFUSION CHAIR 4 [facility]        Today's Diagnoses     Other iron deficiency anemia    -  1    Supervision of high risk pregnancy, antepartum           Follow-ups after your visit        Your next 10 appointments already scheduled     Jan 12, 2018 12:00 PM CST   Level 2 with RH INFUSION CHAIR 4   [facility] ([facility])    [facility]  [address]  [facility] [address] [phone]            Cory 15, 2018  2:00 PM CST   Level 2 with RH INFUSION CHAIR 7   [facility] ([facility])    [facility]  [address]  [facility] [address] [phone]              Who to contact     If you have questions or need follow up information about today's clinic visit or your schedule please contact [facility] INFUSION SERVICES directly at [phone].  Normal or non-critical lab and imaging results will be communicated to you by NetMoviehart, letter or phone within 4 business days after the clinic has received the results. If you do not hear from us within 7 days, please contact the clinic through NetMoviehart or phone. If you have a critical or abnormal lab result, we will notify you by phone as soon as possible.  Submit refill requests through Fiksu or call your pharmacy and they will forward the refill request to us. Please allow 3 business days for your refill to be completed.          Additional Information About Your Visit        MyChart Information     Fiksu lets you send messages to your doctor, view your test results, renew your prescriptions, schedule appointments and  "more. To sign up, go to [email or web address] . Click on \"Log in\" on the left side of the screen, which will take you to the Welcome page. Then click on \"Sign up Now\" on the right side of the page.     You will be asked to enter the access code listed below, as well as some personal information. Please follow the directions to create your username and password.     Your access code is: [email or web address]  Expires: 2018 12:31 PM     Your access code will  in 90 days. If you need help or a new code, please call your [place] clinic or [phone].        Care EveryWhere ID     This is your Care EveryWhere ID. This could be used by other organizations to access your [place] medical records  [id number]        Your Vitals Were     Pulse Temperature Respirations Last Period          96 98.2  F (36.8  C) (Tympanic) 16 2017         Blood Pressure from Last 3 Encounters:   01/10/18 103/69   18 104/70   17 105/67    Weight from Last 3 Encounters:   18 68.3 kg (150 lb 9.6 oz)   18 67.2 kg (148 lb 3.2 oz)   17 58.2 kg (128 lb 4.8 oz)              Today, you had the following     No orders found for display       Primary Care Provider Office Phone # Fax #    [facility] [phone]       [address] [facility] [address]        Equal Access to Services     INDY RITCHIE : Hadii ruth ku hadasho Soomaali, waaxda luqadaha, qaybta kaalmada adeegyalevi, faye villalobos . So [facility] [phone].    ATENCIÓN: Si habla español, tiene a aguilar disposición servicios gratuitos de asistencia lingüística. Llame al [phone].    We comply with applicable federal civil rights laws and [place] laws. We do not discriminate on the basis of race, color, national origin, age, disability, sex, sexual orientation, or gender identity.            Thank you!     Thank you for choosing [facility] INFUSION SERVICES  for " your care. Our goal is always to provide you with excellent care. Hearing back from our patients is one way we can continue to improve our services. Please take a few minutes to complete the written survey that you may receive in the mail after your visit with us. Thank you!             Your Updated Medication List - Protect others around you: Learn how to safely use, store and throw away your medicines at www.disposemymeds.org.          This list is accurate as of: 1/10/18  4:15 PM.  Always use your most recent med list.                   Brand Name Dispense Instructions for use Diagnosis    prenatal multivitamin plus iron 27-0.8 MG Tabs per tablet      Take 1 tablet by mouth daily

## 2018-01-10 NOTE — PROGRESS NOTES
Infusion Nursing Note:  Kristi French presents today for venofer, 1 of 3.    Patient seen by provider today: No   present during visit today: Not Applicable.    Note: Reviewed side effects and gave printed drug information to patient for venofer.    Patient complained of pain and numbness at and above IV site shortly after starting venofer.  IV site looked normal.  Applied heat which helped.  After taking IV out patient complained that hand and arm were hurting.  Site still looked normal with good blood return.  Explained iron can cause vein irritation and she should apply heat at home.  Told her with next infusion we can try putting IV somewhere other than in her hand to see if that helps.    Intravenous Access:  Peripheral IV placed.    Treatment Conditions:  Not Applicable.      Post Infusion Assessment:  Patient tolerated infusion without incident.  Blood return noted pre and post infusion.  No evidence of extravasations.  Access discontinued per protocol.    Discharge Plan:   AVS to patient via MYCHART.  Patient will return 1/12/18 for next appointment.   Patient discharged in stable condition accompanied by: self.  Departure Mode: Ambulatory.    Mirella Robert RN

## 2018-01-10 NOTE — PROGRESS NOTES
Please see full imaging report from ViewPoint program under imaging tab.    Findings reviewed with Kristi today. Given normal fetal growth, and absence of arrhythmia for some time, at 36 weeks, no additional MFM US is indicated unless other complications arise or arrhythmia returns.     Sim Gutierrez MD  Maternal Fetal Medicine

## 2018-01-15 ENCOUNTER — INFUSION THERAPY VISIT (OUTPATIENT)
Dept: INFUSION THERAPY | Facility: CLINIC | Age: 23
End: 2018-01-15
Attending: OBSTETRICS & GYNECOLOGY
Payer: COMMERCIAL

## 2018-01-15 VITALS
RESPIRATION RATE: 16 BRPM | OXYGEN SATURATION: 100 % | SYSTOLIC BLOOD PRESSURE: 108 MMHG | DIASTOLIC BLOOD PRESSURE: 74 MMHG | HEART RATE: 90 BPM | TEMPERATURE: 97.6 F

## 2018-01-15 DIAGNOSIS — D50.8 OTHER IRON DEFICIENCY ANEMIA: Primary | ICD-10-CM

## 2018-01-15 DIAGNOSIS — O09.90 SUPERVISION OF HIGH RISK PREGNANCY, ANTEPARTUM: ICD-10-CM

## 2018-01-15 PROCEDURE — 25000128 H RX IP 250 OP 636: Performed by: OBSTETRICS & GYNECOLOGY

## 2018-01-15 PROCEDURE — 96365 THER/PROPH/DIAG IV INF INIT: CPT

## 2018-01-15 PROCEDURE — 96366 THER/PROPH/DIAG IV INF ADDON: CPT

## 2018-01-15 RX ADMIN — IRON SUCROSE 300 MG: 20 INJECTION, SOLUTION INTRAVENOUS at 14:51

## 2018-01-15 ASSESSMENT — PAIN SCALES - GENERAL: PAINLEVEL: NO PAIN (0)

## 2018-01-15 NOTE — MR AVS SNAPSHOT
"              After Visit Summary   1/15/2018    Kristi French    MRN: [id number]           Patient Information     Date Of Birth          1995        Visit Information        Provider Department      1/15/2018 2:00 PM RH INFUSION CHAIR 7 [facility]        Today's Diagnoses     Other iron deficiency anemia    -  1    Supervision of high risk pregnancy, antepartum           Follow-ups after your visit        Your next 10 appointments already scheduled     Jan 17, 2018  1:30 PM CST   Level 2 with RH INFUSION CHAIR 6   [facility] ([facility])    [facility]  [address]  [facility] [id number] [phone]              Who to contact     If you have questions or need follow up information about today's clinic visit or your schedule please contact [facility] directly at [phone].  Normal or non-critical lab and imaging results will be communicated to you by MyChart, letter or phone within 4 business days after the clinic has received the results. If you do not hear from us within 7 days, please contact the clinic through MyChart or phone. If you have a critical or abnormal lab result, we will notify you by phone as soon as possible.  Submit refill requests through [employer] or call your pharmacy and they will forward the refill request to us. Please allow 3 business days for your refill to be completed.          Additional Information About Your Visit        MyChart Information     [employer] lets you send messages to your doctor, view your test results, renew your prescriptions, schedule appointments and more. To sign up, go to [email or web address] . Click on \"Log in\" on the left side of the screen, which will take you to the Welcome page. Then click on \"Sign up Now\" on the right side of the page.     You will be asked to enter the access code " listed below, as well as some personal information. Please follow the directions to create your username and password.     Your access code is: [email or web address]  Expires: 2018 12:31 PM     Your access code will  in 90 days. If you need help or a new code, please call your [place] clinic or [phone].        Care EveryWhere ID     This is your Care EveryWhere ID. This could be used by other organizations to access your [place] medical records  [id number]        Your Vitals Were     Pulse Temperature Respirations Last Period Pulse Oximetry       90 97.6  F (36.4  C) 16 2017 100%        Blood Pressure from Last 3 Encounters:   01/15/18 108/74   01/10/18 103/69   18 104/70    Weight from Last 3 Encounters:   18 68.3 kg (150 lb 9.6 oz)   18 67.2 kg (148 lb 3.2 oz)   17 58.2 kg (128 lb 4.8 oz)              Today, you had the following     No orders found for display       Primary Care Provider Office Phone # Fax #    [facility] [phone] [address] [facility] [address]        Equal Access to Services     INDY RITCHIE AH: Hadii ruth ku hadasho Soomaali, waaxda luqadaha, qaybta kaalmada adeegyada, waxay idiin hayyazan villalobos . So [facility] [phone].    ATENCIÓN: Si habla español, tiene a aguilar disposición servicios gratuitos de asistencia lingüística. Llame al [phone].    We comply with applicable federal civil rights laws and [place] laws. We do not discriminate on the basis of race, color, national origin, age, disability, sex, sexual orientation, or gender identity.            Thank you!     Thank you for choosing [facility]  for your care. Our goal is always to provide you with excellent care. Hearing back from our patients is one way we can continue to improve our services. Please take a few minutes to complete the written survey that you may receive in the mail  after your visit with us. Thank you!             Your Updated Medication List - Protect others around you: Learn how to safely use, store and throw away your medicines at www.disposemymeds.org.          This list is accurate as of: 1/15/18  4:26 PM.  Always use your most recent med list.                   Brand Name Dispense Instructions for use Diagnosis    prenatal multivitamin plus iron 27-0.8 MG Tabs per tablet      Take 1 tablet by mouth daily

## 2018-01-15 NOTE — PROGRESS NOTES
Infusion Nursing Note:  Kristi French presents today for Venofer, dose 2 of 3.    Patient seen by provider today: No   present during visit today: Not Applicable.    Note: Patient complained of discomfort with IV start and saline flush. Flushes well and good blood return. Patient reports she had pain with last infusion as well, that pain lasted the duration of the infusion and one hour post-infusion. Warm blanket placed around arm for comfort; tolerated infusion with no complaints of discomfort.     Intravenous Access:  Peripheral IV placed.    Treatment Conditions:  Not Applicable.    Post Infusion Assessment:  Patient tolerated infusion without incident.  Blood return noted pre and post infusion.  Site patent and intact, free from redness, edema or discomfort.  No evidence of extravasations.  Access discontinued per protocol.    Discharge Plan:   Discharge instructions reviewed with: Patient.  Patient and/or family verbalized understanding of discharge instructions and all questions answered.  AVS to patient via You[employer].  Patient will return 1/17/18 for Venofer third dose for next appointment.   Patient discharged in stable condition accompanied by: self.  Departure Mode: Ambulatory.    Dilcia Puga RN

## 2018-01-23 ENCOUNTER — PRENATAL OFFICE VISIT (OUTPATIENT)
Dept: OBGYN | Facility: CLINIC | Age: 23
End: 2018-01-23
Payer: COMMERCIAL

## 2018-01-23 VITALS
HEART RATE: 80 BPM | SYSTOLIC BLOOD PRESSURE: 104 MMHG | BODY MASS INDEX: 27.7 KG/M2 | DIASTOLIC BLOOD PRESSURE: 68 MMHG | WEIGHT: 146.6 LBS

## 2018-01-23 DIAGNOSIS — O09.90 SUPERVISION OF HIGH RISK PREGNANCY, ANTEPARTUM: Primary | ICD-10-CM

## 2018-01-23 PROCEDURE — 99207 ZZC PRENATAL VISIT: CPT | Performed by: OBSTETRICS & GYNECOLOGY

## 2018-01-23 NOTE — MR AVS SNAPSHOT
After Visit Summary   1/23/2018    Kristi French    MRN: [id number]           Patient Information     Date Of Birth          1995        Visit Information        Provider Department      1/23/2018 2:15 PM Jennifer Justin MD [facility]        Today's Diagnoses     Supervision of high risk pregnancy, antepartum    -  1       Follow-ups after your visit        Your next 10 appointments already scheduled     Jan 25, 2018  2:00 PM CST   Level 2 with RH INFUSION CHAIR 1   [facility] ([facility])    [facility]  [address]  [facility] [address]   [phone]            Jan 30, 2018  3:45 PM CST   ESTABLISHED PRENATAL with Jennifer Justin MD   [facility] ([facility]    [address] [place] [phone]            Feb 06, 2018  4:15 PM CST   ESTABLISHED PRENATAL with Jennifer Justin MD   [facility] ([facility])    [address] [place] [phone]              Who to contact     If you have questions or need follow up information about today's clinic visit or your schedule please contact [facility] directly at [phone].  Normal or non-critical lab and imaging results will be communicated to you by MyChart, letter or phone within 4 business days after the clinic has received the results. If you do not hear from us within 7 days, please contact the clinic through MyChart or phone. If you have a critical or abnormal lab result, we will notify you by phone as soon as possible.  Submit refill requests through [employer] or call your pharmacy and they will forward the refill request to us. Please allow 3 business days for your refill to be completed.          Additional Information About Your Visit       "  MyChart Information     [employer] lets you send messages to your doctor, view your test results, renew your prescriptions, schedule appointments and more. To sign up, go to [email or web address] . Click on \"Log in\" on the left side of the screen, which will take you to the Welcome page. Then click on \"Sign up Now\" on the right side of the page.     You will be asked to enter the access code listed below, as well as some personal information. Please follow the directions to create your username and password.     Your access code is: [email or web address]  Expires: 2018 12:31 PM     Your access code will  in 90 days. If you need help or a new code, please call your [place] clinic or [phone].        Care EveryWhere ID     This is your Care EveryWhere ID. This could be used by other organizations to access your [place] medical records  [id number]        Your Vitals Were     Pulse Last Period BMI (Body Mass Index)             80 2017 27.7 kg/m2          Blood Pressure from Last 3 Encounters:   18 104/68   01/15/18 108/74   01/10/18 103/69    Weight from Last 3 Encounters:   18 66.5 kg (146 lb 9.6 oz)   18 68.3 kg (150 lb 9.6 oz)   18 67.2 kg (148 lb 3.2 oz)              Today, you had the following     No orders found for display       Primary Care Provider Office Phone # Fax #    [facility] [phone] [address] [facility] [address]        Equal Access to Services     INDY RITCHIE : Hadii ruth chanel [place]nard, waaxda luqadaha, qaybta kaalmada adeegyada, faye villalobos . So [facility] [phone].    ATENCIÓN: Si habla español, tiene a aguilar disposición servicios gratuitos de asistencia lingüística. Llame al [phone].    We comply with applicable federal civil rights laws and [place] laws. We do not discriminate on the basis of race, color, national origin, age, disability, sex, sexual " orientation, or gender identity.            Thank you!     Thank you for choosing [facility]  for your care. Our goal is always to provide you with excellent care. Hearing back from our patients is one way we can continue to improve our services. Please take a few minutes to complete the written survey that you may receive in the mail after your visit with us. Thank you!             Your Updated Medication List - Protect others around you: Learn how to safely use, store and throw away your medicines at www.disposemymeds.org.          This list is accurate as of: 1/23/18  2:53 PM.  Always use your most recent med list.                   Brand Name Dispense Instructions for use Diagnosis    prenatal multivitamin plus iron 27-0.8 MG Tabs per tablet      Take 1 tablet by mouth daily

## 2018-01-23 NOTE — NURSING NOTE
"Chief Complaint   Patient presents with     Prenatal Care     38 weeks 0 days, patient would her cervix checked.        Initial /68 (BP Location: Right arm, Patient Position: Chair, Cuff Size: Adult Regular)  Pulse 80  Wt 66.5 kg (146 lb 9.6 oz)  LMP 05/02/2017  BMI 27.7 kg/m2 Estimated body mass index is 27.7 kg/(m^2) as calculated from the following:    Height as of 1/2/18: 1.549 m (5' 1\").    Weight as of this encounter: 66.5 kg (146 lb 9.6 oz).  Medication Reconciliation: complete     Scott Red CMA      "

## 2018-01-23 NOTE — PROGRESS NOTES
Routine OB Visit:    S: Patient reports feeling well. Denies contractions. Good fetal movement. No LoF, VB.     O: /68 (BP Location: Right arm, Patient Position: Chair, Cuff Size: Adult Regular)  Pulse 80  Wt 66.5 kg (146 lb 9.6 oz)  LMP 2017  BMI 27.7 kg/m2   Gen: resting comfortably, in NAD  Doptone: 140s with fetal arrhythmia noted, including dropped beats ever 5-10 beats.  See Prenatal flowsheet    A: Kristi French is a 22 year old female  at 38w0d, here for routine OB care. Pregnancy c/b scant prenatal care, 2VC, and arrhythmia.    P:   1) 2VC: growth 40th%ile, ELIZABETH within normal limits.  3) Delivery plan: understands that our group only delivers at [facility]. Will plan to deliver there. Reviewed labor precautions.   4) GBS neg. S/p Tdap    Return to clinic in 1 week.       Jennifer Justin MD  Obstetrics and Gynecology

## 2018-01-25 ENCOUNTER — INFUSION THERAPY VISIT (OUTPATIENT)
Dept: INFUSION THERAPY | Facility: CLINIC | Age: 23
End: 2018-01-25
Attending: OBSTETRICS & GYNECOLOGY
Payer: COMMERCIAL

## 2018-01-25 VITALS — SYSTOLIC BLOOD PRESSURE: 114 MMHG | DIASTOLIC BLOOD PRESSURE: 63 MMHG | HEART RATE: 87 BPM | TEMPERATURE: 97.8 F

## 2018-01-25 DIAGNOSIS — D50.8 OTHER IRON DEFICIENCY ANEMIA: Primary | ICD-10-CM

## 2018-01-25 DIAGNOSIS — O09.90 SUPERVISION OF HIGH RISK PREGNANCY, ANTEPARTUM: ICD-10-CM

## 2018-01-25 PROCEDURE — 25000128 H RX IP 250 OP 636: Performed by: OBSTETRICS & GYNECOLOGY

## 2018-01-25 PROCEDURE — 96365 THER/PROPH/DIAG IV INF INIT: CPT

## 2018-01-25 RX ADMIN — IRON SUCROSE 300 MG: 20 INJECTION, SOLUTION INTRAVENOUS at 14:26

## 2018-01-25 NOTE — MR AVS SNAPSHOT
After Visit Summary   1/25/2018    Kristi French    MRN: [id number]           Patient Information     Date Of Birth          1995        Visit Information        Provider Department      1/25/2018 2:00 PM RH INFUSION CHAIR 12 [facility]        Today's Diagnoses     Other iron deficiency anemia    -  1    Supervision of high risk pregnancy, antepartum           Follow-ups after your visit        Your next 10 appointments already scheduled     Jan 30, 2018  3:45 PM CST   ESTABLISHED PRENATAL with Jennifer Justin MD   [facility] ([facility])    [address] [place] [id number] [phone]            Feb 06, 2018  4:15 PM CST   ESTABLISHED PRENATAL with Jennifer Justin MD   [facility] ([facility])    [address] [place] [id number] [phone]              Who to contact     If you have questions or need follow up information about today's clinic visit or your schedule please contact [facility] directly at [phone].  Normal or non-critical lab and imaging results will be communicated to you by MyChart, letter or phone within 4 business days after the clinic has received the results. If you do not hear from us within 7 days, please contact the clinic through [employer] or phone. If you have a critical or abnormal lab result, we will notify you by phone as soon as possible.  Submit refill requests through [employer] or call your pharmacy and they will forward the refill request to us. Please allow 3 business days for your refill to be completed.          Additional Information About Your Visit        MyChart Information     [employer] lets you send messages to your doctor, view your test results, renew your prescriptions, schedule appointments and more. To sign up, go to [email or web address] .  "Click on \"Log in\" on the left side of the screen, which will take you to the Welcome page. Then click on \"Sign up Now\" on the right side of the page.     You will be asked to enter the access code listed below, as well as some personal information. Please follow the directions to create your username and password.     Your access code is: [email or web address]  Expires: 2018 12:31 PM     Your access code will  in 90 days. If you need help or a new code, please call your [place] clinic or [phone].        Care EveryWhere ID     This is your Care EveryWhere ID. This could be used by other organizations to access your [place] medical records  [id number]        Your Vitals Were     Pulse Temperature Last Period             87 97.8  F (36.6  C) (Tympanic) 2017          Blood Pressure from Last 3 Encounters:   18 114/63   18 104/68   01/15/18 108/74    Weight from Last 3 Encounters:   18 66.5 kg (146 lb 9.6 oz)   18 68.3 kg (150 lb 9.6 oz)   18 67.2 kg (148 lb 3.2 oz)              Today, you had the following     No orders found for display       Primary Care Provider Office Phone # Fax #    [place] [facility] [phone] [address] [facility] [address]        Equal Access to Services     INDY RITCHIE : Hadii aad ku hadasho Soomaali, waaxda luqadaha, qaybta kaalmada adeegyada, waxay idiin hayaan adeeg kharash la'aan . So [facility] [phone].    ATENCIÓN: Si habla español, tiene a aguilar disposición servicios gratuitos de asistencia lingüística. Llame al [phone].    We comply with applicable federal civil rights laws and [place] laws. We do not discriminate on the basis of race, color, national origin, age, disability, sex, sexual orientation, or gender identity.            Thank you!     Thank you for choosing [facility]  for your care. Our goal is always to provide you with excellent " care. Hearing back from our patients is one way we can continue to improve our services. Please take a few minutes to complete the written survey that you may receive in the mail after your visit with us. Thank you!             Your Updated Medication List - Protect others around you: Learn how to safely use, store and throw away your medicines at www.disposemymeds.org.          This list is accurate as of 1/25/18  4:01 PM.  Always use your most recent med list.                   Brand Name Dispense Instructions for use Diagnosis    prenatal multivitamin plus iron 27-0.8 MG Tabs per tablet      Take 1 tablet by mouth daily

## 2018-01-25 NOTE — PROGRESS NOTES
Infusion Nursing Note:  Kristi French presents today for #3 of 3 Venofer.    Patient seen by provider today: No   present during visit today: Not Applicable.    Note: Hot pack applied to IV site during infusion for comfort.    Intravenous Access:  Peripheral IV placed.    Treatment Conditions:  Not Applicable.      Post Infusion Assessment:  Patient tolerated injection without incident.  Blood return noted pre and post infusion.  Site patent and intact, free from redness, edema or discomfort.  No evidence of extravasations.  Access discontinued per protocol.    Discharge Plan:   Patient discharged in stable condition accompanied by: self.  Departure Mode: Ambulatory.    Manju Barfield RN

## 2018-01-30 ENCOUNTER — PRENATAL OFFICE VISIT (OUTPATIENT)
Dept: OBGYN | Facility: CLINIC | Age: 23
End: 2018-01-30
Payer: COMMERCIAL

## 2018-01-30 VITALS
HEART RATE: 100 BPM | BODY MASS INDEX: 28.51 KG/M2 | WEIGHT: 150.9 LBS | SYSTOLIC BLOOD PRESSURE: 110 MMHG | DIASTOLIC BLOOD PRESSURE: 68 MMHG

## 2018-01-30 DIAGNOSIS — O09.90 SUPERVISION OF HIGH RISK PREGNANCY, ANTEPARTUM: Primary | ICD-10-CM

## 2018-01-30 PROCEDURE — 99207 ZZC PRENATAL VISIT: CPT | Performed by: OBSTETRICS & GYNECOLOGY

## 2018-01-30 NOTE — MR AVS SNAPSHOT
"              After Visit Summary   1/30/2018    Kristi French    MRN: [id number]           Patient Information     Date Of Birth          1995        Visit Information        Provider Department      1/30/2018 3:45 PM Jennifer Justin MD [facility]        Today's Diagnoses     Supervision of high risk pregnancy, antepartum    -  1       Follow-ups after your visit        Your next 10 appointments already scheduled     Feb 06, 2018  4:15 PM CST   ESTABLISHED PRENATAL with Jennifer Justin MD   [facility] ([facility])    [address] [phone]              Who to contact     If you have questions or need follow up information about today's clinic visit or your schedule please contact [facility] directly at [phone].  Normal or non-critical lab and imaging results will be communicated to you by MyChart, letter or phone within 4 business days after the clinic has received the results. If you do not hear from us within 7 days, please contact the clinic through MyChart or phone. If you have a critical or abnormal lab result, we will notify you by phone as soon as possible.  Submit refill requests through [employer] or call your pharmacy and they will forward the refill request to us. Please allow 3 business days for your refill to be completed.          Additional Information About Your Visit        MyChart Information     [employer] lets you send messages to your doctor, view your test results, renew your prescriptions, schedule appointments and more. To sign up, go to [email or web address] . Click on \"Log in\" on the left side of the screen, which will take you to the Welcome page. Then click on \"Sign up Now\" on the right side of the page.     You will be asked to enter the access code listed below, as well as some personal information. Please follow the directions to " create your username and password.     Your access code is: [email or web address]  Expires: 2018 12:31 PM     Your access code will  in 90 days. If you need help or a new code, please call your [place] clinic or [phone].        Care EveryWhere ID     This is your Care EveryWhere ID. This could be used by other organizations to access your [place] medical records  [id number]        Your Vitals Were     Pulse Last Period BMI (Body Mass Index)             100 2017 28.51 kg/m2          Blood Pressure from Last 3 Encounters:   18 110/68   18 114/63   18 104/68    Weight from Last 3 Encounters:   18 150 lb 14.4 oz (68.4 kg)   18 146 lb 9.6 oz (66.5 kg)   18 150 lb 9.6 oz (68.3 kg)              Today, you had the following     No orders found for display       Primary Care Provider Office Phone # Fax #    [facility] [phone]       [address] [facility] [facility] [address]        Equal Access to Services     INDY RITCHIE : Hadii aad ku hadasho Soomaali, waaxda luqadaha, qaybta kaalmada adeegyada, faye duffy. So [facility] [phone].    ATENCIÓN: Si habla español, tiene a aguilar disposición servicios gratuitos de asistencia lingüística. Llame al [phone].    We comply with applicable federal civil rights laws and [place] laws. We do not discriminate on the basis of race, color, national origin, age, disability, sex, sexual orientation, or gender identity.            Thank you!     Thank you for choosing [facility]  for your care. Our goal is always to provide you with excellent care. Hearing back from our patients is one way we can continue to improve our services. Please take a few minutes to complete the written survey that you may receive in the mail after your visit with us. Thank you!             Your Updated Medication List - Protect others around you: Learn how to  safely use, store and throw away your medicines at www.disposemymeds.org.          This list is accurate as of 1/30/18  4:06 PM.  Always use your most recent med list.                   Brand Name Dispense Instructions for use Diagnosis    prenatal multivitamin plus iron 27-0.8 MG Tabs per tablet      Take 1 tablet by mouth daily

## 2018-01-30 NOTE — NURSING NOTE
"Chief Complaint   Patient presents with     Prenatal Care     39 weeks 0 days, no questions or concerns       Initial /68 (BP Location: Right arm, Patient Position: Chair, Cuff Size: Adult Regular)  Pulse 100  Wt 150 lb 14.4 oz (68.4 kg)  LMP 05/02/2017  BMI 28.51 kg/m2 Estimated body mass index is 28.51 kg/(m^2) as calculated from the following:    Height as of 1/2/18: 5' 1\" (1.549 m).    Weight as of this encounter: 150 lb 14.4 oz (68.4 kg).  Medication Reconciliation: complete     Scott Red CMA      "

## 2018-01-30 NOTE — PROGRESS NOTES
Routine OB Visit:    S: Patient reports feeling well. Denies contractions. Good fetal movement. No LoF, VB.     O: /68 (BP Location: Right arm, Patient Position: Chair, Cuff Size: Adult Regular)  Pulse 100  Wt 150 lb 14.4 oz (68.4 kg)  LMP 2017  BMI 28.51 kg/m2   Gen: resting comfortably, in NAD  Doptone: 140s  See Prenatal flowsheet    A: Kristi French is a 22 year old female  at 39w0d, here for routine OB care. Pregnancy c/b scant prenatal care, 2VC, and arrhythmia.    P:   1) 2VC: growth 40th%ile, ELIZABETH within normal limits.  3) Delivery plan: plans to delivery at [facility]. Discussed IOL at 41w if not delivered by then. Will review at next visit  4) GBS neg. S/p Tdap    Return to clinic in 1 week.       Jennifer Justin MD  Obstetrics and Gynecology

## 2018-02-06 ENCOUNTER — PRENATAL OFFICE VISIT (OUTPATIENT)
Dept: OBGYN | Facility: CLINIC | Age: 23
End: 2018-02-06
Payer: COMMERCIAL

## 2018-02-06 VITALS
HEART RATE: 94 BPM | SYSTOLIC BLOOD PRESSURE: 104 MMHG | DIASTOLIC BLOOD PRESSURE: 76 MMHG | WEIGHT: 148.6 LBS | BODY MASS INDEX: 28.08 KG/M2

## 2018-02-06 DIAGNOSIS — O09.90 SUPERVISION OF HIGH RISK PREGNANCY, ANTEPARTUM: Primary | ICD-10-CM

## 2018-02-06 PROCEDURE — 99207 ZZC PRENATAL VISIT: CPT | Performed by: OBSTETRICS & GYNECOLOGY

## 2018-02-06 NOTE — MR AVS SNAPSHOT
"              After Visit Summary   2018    Kristi French    MRN: [id number]           Patient Information     Date Of Birth          1995        Visit Information        Provider Department      2018 4:15 PM Jennifer Justin MD [facility]        Today's Diagnoses     Supervision of high risk pregnancy, antepartum    -  1       Follow-ups after your visit        Who to contact     If you have questions or need follow up information about today's clinic visit or your schedule please contact [facility] directly at [phone].  Normal or non-critical lab and imaging results will be communicated to you by [employer], letter or phone within 4 business days after the clinic has received the results. If you do not hear from us within 7 days, please contact the clinic through [employer] or phone. If you have a critical or abnormal lab result, we will notify you by phone as soon as possible.  Submit refill requests through [employer] or call your pharmacy and they will forward the refill request to us. Please allow 3 business days for your refill to be completed.          Additional Information About Your Visit        MyChart Information     [employer] lets you send messages to your doctor, view your test results, renew your prescriptions, schedule appointments and more. To sign up, go to [email or web address] . Click on \"Log in\" on the left side of the screen, which will take you to the Welcome page. Then click on \"Sign up Now\" on the right side of the page.     You will be asked to enter the access code listed below, as well as some personal information. Please follow the directions to create your username and password.     Your access code is: [email or web address]  Expires: 2018 12:31 PM     Your access code will  in 90 days. If you need help or a new code, please call your [facility] or [phone].        Care EveryWhere ID     This is your Care " EveryWhere ID. This could be used by other organizations to access your [facility] medical records  [id number]        Your Vitals Were     Pulse Last Period BMI (Body Mass Index)             94 05/02/2017 28.08 kg/m2          Blood Pressure from Last 3 Encounters:   02/06/18 104/76   01/30/18 110/68   01/25/18 114/63    Weight from Last 3 Encounters:   02/06/18 148 lb 9.6 oz (67.4 kg)   01/30/18 150 lb 14.4 oz (68.4 kg)   01/23/18 146 lb 9.6 oz (66.5 kg)              Today, you had the following     No orders found for display       Primary Care Provider Office Phone # Fax #    [facility] [phone] [address] [facility] [address]        Equal Access to Services     INDY RITCHIE : Hadii aad ku hadasho Soomaali, waaxda luqadaha, qaybta kaalmada adeegyada, faye nguyenin hayyazan villalobos . So [facility] [phone].    ATENCIÓN: Si habla español, tiene a aguilar disposición servicios gratuitos de asistencia lingüística. Llame al [phone].    We comply with applicable federal civil rights laws and [place] laws. We do not discriminate on the basis of race, color, national origin, age, disability, sex, sexual orientation, or gender identity.            Thank you!     Thank you for choosing [facility]  for your care. Our goal is always to provide you with excellent care. Hearing back from our patients is one way we can continue to improve our services. Please take a few minutes to complete the written survey that you may receive in the mail after your visit with us. Thank you!             Your Updated Medication List - Protect others around you: Learn how to safely use, store and throw away your medicines at www.disposemymeds.org.          This list is accurate as of 2/6/18  5:00 PM.  Always use your most recent med list.                   Brand Name Dispense Instructions for use Diagnosis    prenatal multivitamin plus iron 27-0.8  MG Tabs per tablet      Take 1 tablet by mouth daily

## 2018-02-06 NOTE — PROGRESS NOTES
Routine OB Visit:    S: Patient reports feeling well. Denies contractions. Good fetal movement. No LoF, VB.     O: /76 (BP Location: Right arm, Patient Position: Chair, Cuff Size: Adult Regular)  Pulse 94  Wt 148 lb 9.6 oz (67.4 kg)  LMP 2017  BMI 28.08 kg/m2   Gen: resting comfortably, in NAD  Doptone: 140s  See Prenatal flowsheet    A: Kristi French is a 22 year old female  at 40w0d, here for routine OB care. Pregnancy c/b scant prenatal care, 2VC, and arrhythmia.    P:   1) 2VC: growth 40th%ile, ELIZABETH within normal limits.  3) Delivery plan: plans to delivery at [facility]. IOL at 41w if not delivered by then, sheet sent.  4) GBS neg. S/p Tdap    Return to clinic in 1 week.       Jennifer Justin MD  Obstetrics and Gynecology

## 2018-02-06 NOTE — NURSING NOTE
"Chief Complaint   Patient presents with     Prenatal Care     40 weeks 0 days. Pt reports she has not had any contractions       Initial /76 (BP Location: Right arm, Patient Position: Chair, Cuff Size: Adult Regular)  Pulse 94  Wt 148 lb 9.6 oz (67.4 kg)  LMP 05/02/2017  BMI 28.08 kg/m2 Estimated body mass index is 28.08 kg/(m^2) as calculated from the following:    Height as of 1/2/18: 5' 1\" (1.549 m).    Weight as of this encounter: 148 lb 9.6 oz (67.4 kg).  Medication Reconciliation: complete     Scott Red CMA      "

## 2018-02-07 ENCOUNTER — ANESTHESIA (OUTPATIENT)
Dept: OBGYN | Facility: CLINIC | Age: 23
End: 2018-02-07
Payer: COMMERCIAL

## 2018-02-07 ENCOUNTER — HOSPITAL ENCOUNTER (INPATIENT)
Facility: CLINIC | Age: 23
LOS: 2 days | Discharge: HOME | End: 2018-02-09
Attending: OBSTETRICS & GYNECOLOGY | Admitting: OBSTETRICS & GYNECOLOGY
Payer: COMMERCIAL

## 2018-02-07 ENCOUNTER — ANESTHESIA EVENT (OUTPATIENT)
Dept: OBGYN | Facility: CLINIC | Age: 23
End: 2018-02-07
Payer: COMMERCIAL

## 2018-02-07 ENCOUNTER — NURSE TRIAGE (OUTPATIENT)
Dept: NURSING | Facility: CLINIC | Age: 23
End: 2018-02-07

## 2018-02-07 DIAGNOSIS — D50.9 IRON DEFICIENCY ANEMIA, UNSPECIFIED IRON DEFICIENCY ANEMIA TYPE: ICD-10-CM

## 2018-02-07 PROBLEM — Z34.80 PRENATAL CARE, SUBSEQUENT PREGNANCY: Status: RESOLVED | Noted: 2017-08-31 | Resolved: 2018-02-07

## 2018-02-07 LAB
ABO + RH BLD: NORMAL
ABO + RH BLD: NORMAL
AMPHETAMINES UR QL SCN: NEGATIVE
CANNABINOIDS UR QL: NEGATIVE
COCAINE UR QL: NEGATIVE
OPIATES UR QL SCN: NEGATIVE
PCP UR QL SCN: NEGATIVE
SPECIMEN EXP DATE BLD: NORMAL
T PALLIDUM IGG+IGM SER QL: NEGATIVE

## 2018-02-07 PROCEDURE — 36415 COLL VENOUS BLD VENIPUNCTURE: CPT | Performed by: OBSTETRICS & GYNECOLOGY

## 2018-02-07 PROCEDURE — 86901 BLOOD TYPING SEROLOGIC RH(D): CPT | Performed by: OBSTETRICS & GYNECOLOGY

## 2018-02-07 PROCEDURE — 37000011 ZZH ANESTHESIA WARD SERVICE

## 2018-02-07 PROCEDURE — 40000671 ZZH STATISTIC ANESTHESIA CASE

## 2018-02-07 PROCEDURE — 25000128 H RX IP 250 OP 636: Performed by: ANESTHESIOLOGY

## 2018-02-07 PROCEDURE — 3E0R3BZ INTRODUCTION OF ANESTHETIC AGENT INTO SPINAL CANAL, PERCUTANEOUS APPROACH: ICD-10-PCS | Performed by: ANESTHESIOLOGY

## 2018-02-07 PROCEDURE — 72200001 ZZH LABOR CARE VAGINAL DELIVERY SINGLE

## 2018-02-07 PROCEDURE — 00HU33Z INSERTION OF INFUSION DEVICE INTO SPINAL CANAL, PERCUTANEOUS APPROACH: ICD-10-PCS | Performed by: ANESTHESIOLOGY

## 2018-02-07 PROCEDURE — 25000125 ZZHC RX 250: Performed by: OBSTETRICS & GYNECOLOGY

## 2018-02-07 PROCEDURE — 12000031 ZZH R&B OB CRITICAL

## 2018-02-07 PROCEDURE — 80307 DRUG TEST PRSMV CHEM ANLYZR: CPT | Performed by: OBSTETRICS & GYNECOLOGY

## 2018-02-07 PROCEDURE — 25000128 H RX IP 250 OP 636: Performed by: OBSTETRICS & GYNECOLOGY

## 2018-02-07 PROCEDURE — 86900 BLOOD TYPING SEROLOGIC ABO: CPT | Performed by: OBSTETRICS & GYNECOLOGY

## 2018-02-07 PROCEDURE — 59400 OBSTETRICAL CARE: CPT | Performed by: OBSTETRICS & GYNECOLOGY

## 2018-02-07 PROCEDURE — 86780 TREPONEMA PALLIDUM: CPT | Performed by: OBSTETRICS & GYNECOLOGY

## 2018-02-07 PROCEDURE — 25000132 ZZH RX MED GY IP 250 OP 250 PS 637: Performed by: OBSTETRICS & GYNECOLOGY

## 2018-02-07 RX ORDER — MISOPROSTOL 200 UG/1
400 TABLET ORAL
Status: DISCONTINUED | OUTPATIENT
Start: 2018-02-07 | End: 2018-02-09 | Stop reason: HOSPADM

## 2018-02-07 RX ORDER — ACETAMINOPHEN 325 MG/1
650 TABLET ORAL EVERY 4 HOURS PRN
Status: DISCONTINUED | OUTPATIENT
Start: 2018-02-07 | End: 2018-02-09 | Stop reason: HOSPADM

## 2018-02-07 RX ORDER — EPHEDRINE SULFATE 50 MG/ML
5 INJECTION, SOLUTION INTRAMUSCULAR; INTRAVENOUS; SUBCUTANEOUS
Status: DISCONTINUED | OUTPATIENT
Start: 2018-02-07 | End: 2018-02-07 | Stop reason: HOSPADM

## 2018-02-07 RX ORDER — OXYCODONE HYDROCHLORIDE 5 MG/1
5 TABLET ORAL EVERY 4 HOURS PRN
Status: DISCONTINUED | OUTPATIENT
Start: 2018-02-07 | End: 2018-02-09 | Stop reason: HOSPADM

## 2018-02-07 RX ORDER — OXYTOCIN/0.9 % SODIUM CHLORIDE 30/500 ML
100 PLASTIC BAG, INJECTION (ML) INTRAVENOUS CONTINUOUS
Status: DISCONTINUED | OUTPATIENT
Start: 2018-02-07 | End: 2018-02-09 | Stop reason: HOSPADM

## 2018-02-07 RX ORDER — HYDROCORTISONE 2.5 %
CREAM (GRAM) TOPICAL 3 TIMES DAILY PRN
Status: DISCONTINUED | OUTPATIENT
Start: 2018-02-07 | End: 2018-02-09 | Stop reason: HOSPADM

## 2018-02-07 RX ORDER — FENTANYL CITRATE 50 UG/ML
100 INJECTION, SOLUTION INTRAMUSCULAR; INTRAVENOUS ONCE
Status: COMPLETED | OUTPATIENT
Start: 2018-02-07 | End: 2018-02-07

## 2018-02-07 RX ORDER — ACETAMINOPHEN 325 MG/1
650 TABLET ORAL EVERY 4 HOURS PRN
Status: DISCONTINUED | OUTPATIENT
Start: 2018-02-07 | End: 2018-02-08

## 2018-02-07 RX ORDER — LANOLIN 100 %
OINTMENT (GRAM) TOPICAL
Status: DISCONTINUED | OUTPATIENT
Start: 2018-02-07 | End: 2018-02-09 | Stop reason: HOSPADM

## 2018-02-07 RX ORDER — NALOXONE HYDROCHLORIDE 0.4 MG/ML
.1-.4 INJECTION, SOLUTION INTRAMUSCULAR; INTRAVENOUS; SUBCUTANEOUS
Status: DISCONTINUED | OUTPATIENT
Start: 2018-02-07 | End: 2018-02-09 | Stop reason: HOSPADM

## 2018-02-07 RX ORDER — NALBUPHINE HYDROCHLORIDE 10 MG/ML
2.5-5 INJECTION, SOLUTION INTRAMUSCULAR; INTRAVENOUS; SUBCUTANEOUS EVERY 6 HOURS PRN
Status: DISCONTINUED | OUTPATIENT
Start: 2018-02-07 | End: 2018-02-07 | Stop reason: HOSPADM

## 2018-02-07 RX ORDER — NALOXONE HYDROCHLORIDE 0.4 MG/ML
.1-.4 INJECTION, SOLUTION INTRAMUSCULAR; INTRAVENOUS; SUBCUTANEOUS
Status: DISCONTINUED | OUTPATIENT
Start: 2018-02-07 | End: 2018-02-07 | Stop reason: HOSPADM

## 2018-02-07 RX ORDER — OXYTOCIN 10 [USP'U]/ML
10 INJECTION, SOLUTION INTRAMUSCULAR; INTRAVENOUS
Status: DISCONTINUED | OUTPATIENT
Start: 2018-02-07 | End: 2018-02-09 | Stop reason: HOSPADM

## 2018-02-07 RX ORDER — LIDOCAINE 40 MG/G
CREAM TOPICAL
Status: DISCONTINUED | OUTPATIENT
Start: 2018-02-07 | End: 2018-02-08

## 2018-02-07 RX ORDER — OXYTOCIN/0.9 % SODIUM CHLORIDE 30/500 ML
340 PLASTIC BAG, INJECTION (ML) INTRAVENOUS CONTINUOUS PRN
Status: DISCONTINUED | OUTPATIENT
Start: 2018-02-07 | End: 2018-02-09 | Stop reason: HOSPADM

## 2018-02-07 RX ORDER — BISACODYL 10 MG
10 SUPPOSITORY, RECTAL RECTAL DAILY PRN
Status: DISCONTINUED | OUTPATIENT
Start: 2018-02-09 | End: 2018-02-09 | Stop reason: HOSPADM

## 2018-02-07 RX ORDER — AMOXICILLIN 250 MG
1 CAPSULE ORAL 2 TIMES DAILY PRN
Status: DISCONTINUED | OUTPATIENT
Start: 2018-02-07 | End: 2018-02-09 | Stop reason: HOSPADM

## 2018-02-07 RX ORDER — AMOXICILLIN 250 MG
2 CAPSULE ORAL 2 TIMES DAILY PRN
Status: DISCONTINUED | OUTPATIENT
Start: 2018-02-07 | End: 2018-02-09 | Stop reason: HOSPADM

## 2018-02-07 RX ORDER — FENTANYL CITRATE 50 UG/ML
50-100 INJECTION, SOLUTION INTRAMUSCULAR; INTRAVENOUS
Status: DISCONTINUED | OUTPATIENT
Start: 2018-02-07 | End: 2018-02-08

## 2018-02-07 RX ORDER — OXYCODONE AND ACETAMINOPHEN 5; 325 MG/1; MG/1
1 TABLET ORAL
Status: DISCONTINUED | OUTPATIENT
Start: 2018-02-07 | End: 2018-02-08

## 2018-02-07 RX ORDER — METHYLERGONOVINE MALEATE 0.2 MG/ML
200 INJECTION INTRAVENOUS
Status: DISCONTINUED | OUTPATIENT
Start: 2018-02-07 | End: 2018-02-08

## 2018-02-07 RX ORDER — CARBOPROST TROMETHAMINE 250 UG/ML
250 INJECTION, SOLUTION INTRAMUSCULAR
Status: DISCONTINUED | OUTPATIENT
Start: 2018-02-07 | End: 2018-02-08

## 2018-02-07 RX ORDER — OXYTOCIN 10 [USP'U]/ML
10 INJECTION, SOLUTION INTRAMUSCULAR; INTRAVENOUS
Status: DISCONTINUED | OUTPATIENT
Start: 2018-02-07 | End: 2018-02-08

## 2018-02-07 RX ORDER — ONDANSETRON 2 MG/ML
4 INJECTION INTRAMUSCULAR; INTRAVENOUS EVERY 6 HOURS PRN
Status: DISCONTINUED | OUTPATIENT
Start: 2018-02-07 | End: 2018-02-08

## 2018-02-07 RX ORDER — NALOXONE HYDROCHLORIDE 0.4 MG/ML
.1-.4 INJECTION, SOLUTION INTRAMUSCULAR; INTRAVENOUS; SUBCUTANEOUS
Status: DISCONTINUED | OUTPATIENT
Start: 2018-02-07 | End: 2018-02-08

## 2018-02-07 RX ORDER — SCOLOPAMINE TRANSDERMAL SYSTEM 1 MG/1
1 PATCH, EXTENDED RELEASE TRANSDERMAL ONCE
Status: DISCONTINUED | OUTPATIENT
Start: 2018-02-07 | End: 2018-02-07 | Stop reason: HOSPADM

## 2018-02-07 RX ORDER — IBUPROFEN 800 MG/1
800 TABLET, FILM COATED ORAL
Status: COMPLETED | OUTPATIENT
Start: 2018-02-07 | End: 2018-02-07

## 2018-02-07 RX ORDER — SODIUM CHLORIDE, SODIUM LACTATE, POTASSIUM CHLORIDE, CALCIUM CHLORIDE 600; 310; 30; 20 MG/100ML; MG/100ML; MG/100ML; MG/100ML
INJECTION, SOLUTION INTRAVENOUS CONTINUOUS
Status: DISCONTINUED | OUTPATIENT
Start: 2018-02-07 | End: 2018-02-08

## 2018-02-07 RX ORDER — HYDROMORPHONE HYDROCHLORIDE 1 MG/ML
0.3 INJECTION, SOLUTION INTRAMUSCULAR; INTRAVENOUS; SUBCUTANEOUS
Status: DISCONTINUED | OUTPATIENT
Start: 2018-02-07 | End: 2018-02-09 | Stop reason: HOSPADM

## 2018-02-07 RX ORDER — IBUPROFEN 600 MG/1
600 TABLET, FILM COATED ORAL EVERY 6 HOURS PRN
Status: DISCONTINUED | OUTPATIENT
Start: 2018-02-07 | End: 2018-02-09 | Stop reason: HOSPADM

## 2018-02-07 RX ORDER — OXYTOCIN/0.9 % SODIUM CHLORIDE 30/500 ML
1-24 PLASTIC BAG, INJECTION (ML) INTRAVENOUS CONTINUOUS
Status: DISCONTINUED | OUTPATIENT
Start: 2018-02-07 | End: 2018-02-08

## 2018-02-07 RX ORDER — OXYTOCIN/0.9 % SODIUM CHLORIDE 30/500 ML
100-340 PLASTIC BAG, INJECTION (ML) INTRAVENOUS CONTINUOUS PRN
Status: DISCONTINUED | OUTPATIENT
Start: 2018-02-07 | End: 2018-02-08

## 2018-02-07 RX ADMIN — Medication: at 09:29

## 2018-02-07 RX ADMIN — IBUPROFEN 600 MG: 600 TABLET ORAL at 22:42

## 2018-02-07 RX ADMIN — SODIUM CHLORIDE, POTASSIUM CHLORIDE, SODIUM LACTATE AND CALCIUM CHLORIDE 500 ML: 600; 310; 30; 20 INJECTION, SOLUTION INTRAVENOUS at 09:29

## 2018-02-07 RX ADMIN — SODIUM CHLORIDE, POTASSIUM CHLORIDE, SODIUM LACTATE AND CALCIUM CHLORIDE: 600; 310; 30; 20 INJECTION, SOLUTION INTRAVENOUS at 10:07

## 2018-02-07 RX ADMIN — OXYTOCIN-SODIUM CHLORIDE 0.9% IV SOLN 30 UNIT/500ML 2 MILLI-UNITS/MIN: 30-0.9/5 SOLUTION at 12:55

## 2018-02-07 RX ADMIN — IBUPROFEN 800 MG: 800 TABLET, FILM COATED ORAL at 15:33

## 2018-02-07 RX ADMIN — FENTANYL CITRATE 100 MCG: 50 INJECTION INTRAMUSCULAR; INTRAVENOUS at 09:27

## 2018-02-07 NOTE — TELEPHONE ENCOUNTER
Kristi is about 40 weeks due date on 2/6 but induced on Feb 13th.  Today Breanna is having contractions that are 10 minutes a part.  Breanna is requesting to speak with on call MD.  SIERRA paged on call MD Hussein Hudson to phone patient back at 7:09am.

## 2018-02-07 NOTE — PROVIDER NOTIFICATION
02/07/18 1020   Provider Notification   Provider Name/Title Dr Hudson   Method of Notification At Bedside   Notification Reason SVE   SVE 9.5 and amniotomy performed, moderate amt of mec fluid noted.

## 2018-02-07 NOTE — PROVIDER NOTIFICATION
02/07/18 1249   Provider Notification   Provider Name/Title Dr Hudson   Method of Notification In Department   Notification Reason SVE   Updated Md of SVE ant-lip/100/0, pt not feeling pressure, MD reviewed fetal strip and gave VO for Pitocin.

## 2018-02-07 NOTE — H&P
"  2018    Kristi Aguilar  [id number]            OB Admit History & Physical      Ms. Aguilar  is here in labor.    She has noticed regular contractions    Patient's last menstrual period was 2017.   Her Estimated Date of Delivery: 2018  , making her 40w1d  wks.      Estimated body mass index is 28.34 kg/(m^2) as calculated from the following:    Height as of this encounter: 1.549 m (5' 1\").    Weight as of this encounter: 68 kg (150 lb).  Her prenatal course has been of note for two vessel umbilical cord        Estimated fetal weight=7 1/2 lb       She is a 22 year old   Her OB history:   Obstetric History       T1      L1     SAB0   TAB0   Ectopic0   Multiple0   Live Births1       # Outcome Date GA Lbr Harsh/2nd Weight Sex Delivery Anes PTL Lv   2 Current            1 Term 11 41w3d 06:12 / 01:46 3.25 kg (7 lb 2.6 oz) M Vag-Vacuum EPI  MAX      Name: RISHI AGUILAR      Apgar1:  8                Apgar5: 9               Past Medical History:   Diagnosis Date     Anemia      No active medical problems      Papanicolaou smear of cervix with atypical squamous cells of undetermined significance (ASC-US) 2017 ASCUS in 22 yr old.  Plan: repeat pap only in 1 year          Past Surgical History:   Procedure Laterality Date     NO HISTORY OF SURGERY           No current outpatient prescriptions on file.       Allergies: Review of patient's allergies indicates no known allergies.      REVIEW OF SYSTEMS:  NEUROLOGIC:  Negative  EYES:  Negative  ENT:  Negative  GI:  Negative  BREAST:  Negative  :  Negative  GYN:  Negative  CV:  Negative  PULMONARY:  Negative  MUSCULOSKELETAL:  Negative  PSYCH:  Negative        Social History     Social History     Marital status: Single     Spouse name: N/A     Number of children: N/A     Years of education: N/A     Occupational History     Not on file.     Social History Main Topics     Smoking status: Never Smoker     " Smokeless tobacco: Never Used     Alcohol use No     Drug use: No     Sexual activity: Yes     Partners: Male     Other Topics Concern     Not on file     Social History Narrative      Family History   Problem Relation Age of Onset     CANCER Paternal Grandfather      liver     Eye Disorder Paternal Grandmother      Blind             Vitals:     With contractions every  3 min    Alert Awake in NAD  HEENT grossly normal  Neck: no lymphadenopathy or thryoidomegaly  Lungs clear  Back no spinal or CVAT  Heart RR  ABD gravid, term on exam with vertex palpable  Pelvic:  mec fluid noted, no blood noted  Cervix is 9.5 cm / 100 % effaced at -1 station  EXT:  no edema or calf tenderness  Neuro:  intact    Assessment:  IUP at 40w1d  In labor     Two vessel umbilical cord     meconium    Plan:  Anticipate     [unfilled]      Hussein Hudson MD  Dept of OB/GYN  2018

## 2018-02-07 NOTE — L&D DELIVERY NOTE
Delivery Date:  2018      DELIVERY SUMMARY      DATE OF DELIVERY:  2018       DATE OF DICTATION:  2018       NARRATIVE SUMMARY:  The patient is a 22-year-old  2 para 1 patient, who presents to Labor and Delivery at 40-1/7 weeks in active labor.  She progresses spontaneously to complete at 9.5 cm with epidural for analgesia.  At this point, she develops subnormal labor contractions and Pitocin is added for augmentation.      She proceeded then to deliver over an intact perineum, a living male weighing 7 pounds, 8 ounces, Apgars of 8 and 9, secondary to meconium.  With delivery of the head, the nasal oropharynx was suctioned vigorously with bulb suction.  The nuchal cord was reduced and the baby was delivered.  The cord was immediately clamped, cut and handed to the nurse in attendance with the  Intensive Care Unit (NICU) team on the way.      The  placenta delivered spontaneously with a two-vessel cord.  There were no cervical, vaginal or perineal lacerations.       Sponge and needle counts were correct.  Estimated blood loss was approximately 100 cc.  Mother and  went to normal recovery and  nursery, respectively.         GRECIA HUNT MD             D: 2018   T: 2018   MT: CAROL      Name:     MAUDE AGUILAR   MRN:      [id number]        Account:        [id number]   :      1995        Delivery Date:  2018               Document: [id number]

## 2018-02-07 NOTE — PROVIDER NOTIFICATION
02/07/18 0915   Provider Notification   Provider Name/Title Dr Hudson   Method of Notification At Bedside   Notification Reason SVE;Patient Arrived   Dr Hudson at bedside, updated on sve 8/100/-1, bbow, and pt will be getting an epidural.

## 2018-02-07 NOTE — IP AVS SNAPSHOT
[facility]    [address] [facility] [address]    Phone:  [phone]    Fax:  [phone]                                       After Visit Summary   2/7/2018    Kristi French    MRN: [id number]           After Visit Summary Signature Page     I have received my discharge instructions, and my questions have been answered. I have discussed any challenges I see with this plan with the nurse or doctor.    ..........................................................................................................................................  Patient/Patient Representative Signature      ..........................................................................................................................................  Patient Representative Print Name and Relationship to Patient    ..................................................               ................................................  Date                                            Time    ..........................................................................................................................................  Reviewed by Signature/Title    ...................................................              ..............................................  Date                                                            Time

## 2018-02-07 NOTE — PROVIDER NOTIFICATION
02/07/18 1015   Provider Notification   Provider Name/Title Dr osborn   Method of Notification Phone   Request Evaluate in Person   Notification Reason SVE   Updated MD of martye 9.5 with FLAVIO. MD stated he will be by shortly.

## 2018-02-07 NOTE — PROVIDER NOTIFICATION
02/07/18 1141   Provider Notification   Provider Name/Title Dr Hudson   Method of Notification In Department   Notification Reason SVE   Updated  ant-lip and -1 station, pt not feeling any pressure, FHTs with minimal to moderate variability.

## 2018-02-07 NOTE — PROVIDER NOTIFICATION
02/07/18 0850   Provider Notification   Provider Name/Title Dr Hudson   Method of Notification Phone   Request Evaluate - Remote   Notification Reason Patient Arrived   MD updated on pt arrival and SVE. Intrapartum orders received. Ok to get epidural at anytime

## 2018-02-07 NOTE — PROVIDER NOTIFICATION
02/07/18 1450   Provider Notification   Provider Name/Title Dr Hudson   Method of Notification At Bedside   For delivery.

## 2018-02-07 NOTE — PROVIDER NOTIFICATION
02/07/18 0914   Provider Notification   Provider Name/Title Dr Cody   Method of Notification At Bedside   Notification Reason Pain   MD bedside for placement of labor epidural, consent signed, time out performed.

## 2018-02-07 NOTE — TELEPHONE ENCOUNTER
Reason for Disposition    [1] MILD abdominal pain (e.g., doesn't interfere with normal activities) AND [2] constant AND [3] present > 2 hours    Protocols used: PREGNANCY - ABDOMINAL PAIN GREATER THAN 20 WEEKS EGA-ADULT-AH

## 2018-02-07 NOTE — PLAN OF CARE
Data: Kristi French transferred to 422 via wheelchair at 1715. Baby transferred via parent's arms.  Action: Receiving unit notified of transfer: Yes. Patient and family notified of room change. Report given to Nicole WILLAMS RN at 1725. Belongings sent to receiving unit. Accompanied by Registered Nurse. Oriented patient to surroundings. Call light within reach. ID bands double-checked with receiving RN.  Response: Patient tolerated transfer and is stable.

## 2018-02-07 NOTE — ANESTHESIA PROCEDURE NOTES
Peripheral nerve/Neuraxial procedure note :        Assessment/Narrative  .  .  . Comments:  Pre-Procedure  Performed by Magdaleno Cody MD  Location: OB.      PreAnesthestic Checklist: patient identified, IV checked, risks and benefits discussed, informed consent obtained, monitors and equipment checked, pre-op evaluation and at physician/surgeon's request.    Timeout   Correct Patient: Yes  Correct Procedure: Epidural catheter placement  Correct Site: Yes   Correct Position: Yes    Procedure Documentation  Procedure:   Epidural catheter block for Labor    Patient currently in labor and she and OBMD request a labor epidural to control her labor pains. Patient was interviewed and examined. Procedure and risks including but not limited to bleeding, infection, nerve injury, paralysis, PDPH, and inadequate block requiring intervention discussed with patient. Questions answered. This epidural is to be placed in anticipation of vaginal delivery.  She consents to the epidural procedure.  Time-out was performed.  I or my partners remain immediately available for management of any issues or complications and will monitor at appropriate intervals.  Procedure: Patient sitting. Betadine prep x 3. Sterile drape applied.  Lidocaine 1%  local infiltration at L 3-4.  17 G. Tuohy needle at L3-4 by loss of resistance into epidural space.  No CSF, paresthesia or blood. 1.5 % Lidocaine with 1:200,000 Epinephrine 5cc test dose. Then 0.25% bupivicaine 10 cc with NS 5 cc.  Epidural catheter inserted w/o resistance to 5 cm in epidural space.  Aspiration negative for blood and CSF.   Negative for neuro change, paresthesia or symptoms of intravascular injection or intrathecal injection.  Infusion orders written and infusion of 0.125% bupivicaine 15cc per hour started.    Magdaleno Cody MD

## 2018-02-07 NOTE — IP AVS SNAPSHOT
MRN:[id number]                      After Visit Summary   2018    Kristi French    MRN: [id number]           Thank you!     Thank you for choosing [facility] for your care. Our goal is always to provide you with excellent care. Hearing back from our patients is one way we can continue to improve our services. Please take a few minutes to complete the written survey that you may receive in the mail after you visit. If you would like to speak to someone directly about your visit please contact Patient Relations at [phone]. Thank you!          Patient Information     Date Of Birth          1995        Designated Caregiver       Most Recent Value    Caregiver    Will someone help with your care after discharge? no      About your hospital stay     You were admitted on:  2018 You last received care in the:  [facility]partum    You were discharged on:  2018       Who to Call     For medical emergencies, please call 911.  For non-urgent questions about your medical care, please call your primary care provider or clinic, [phone]          Attending Provider     Provider Specialty    Hussein Hudson MD OB/Gyn    Jennifer Justin MD OB/Gyn       Primary Care Provider Office Phone # Fax #    [facility] [place] [facility] [place] [phone]      Further instructions from your care team       Post Partum Discharge Instructions after a Vaginal Delivery    What to expect:   No matter how well you feel when you get home, these first few days at home are for rest and recuperation. You can be surprised by how much rest it takes to fully recover from the delivery and how little extra time there is for tasks other than eating, sleeping and caring for your . Limit your activities to caring for yourself and your baby; eat, rest and relax. Let others clean, cook and supervise household needs (including older siblings). You  will tire more quickly and this can be emotionally draining in a new situation without giving yourself a chance to recharge regularly.      Physical Changes:    Breast milk comes in within three to six days after delivery. When breast milk comes in, it is not unusual to get a low grade fever (max 101 F) and very hard, sensitive breasts with or without leaking.    Spotting or bleeding, like a period, on and off for six to eight weeks after delivery. Pattern of bleeding varies during the recovery period and will continue to vary with or without breastfeeding. Passing small to medium clots on occasion is also a normal experience.    Menstrual cramping may be more noticeable during and immediately following breastfeeding.    Legs and feet often swell more after delivery than before delivery; this usually returns to normal within two weeks postpartum.    Constipation is a common complaint - this can lead to significant abdominal pain and cramping. Drink plenty of fluids, eat fresh fruit and vegetables, as well as plenty of fiber.    Breast care:  Breastfeeding:    Breastfeeding can be difficult; learning how to breastfeed takes time and patience.     Your milk should come in 3-6 days after delivery. If you are not drinking enough fluids or had an unusual amount of blood loss at delivery, it may take a few days longer.    During the first few days the baby is getting colostrum- this is high in antibodies which are good for the baby.    After nursing, remember to express a little breast milk and rub it on your nipples and allow them to air dry for 10-15 minutes after each feeding. You may also use pure lanolin or vitamin E oil or cream if you prefer.     If your breasts become engorged, try warm compresses or a warm shower, followed by feeding the baby or expressing milk to relieve the discomfort.    Bottle feeding:    Wear a tight fitting bra or bind your breasts with an ACE bandage. Ice packs will help relieve the  soreness. Do not try to express the milk as this will only stimulate the production of more milk.    Emotions:    There are a wide range of emotions you may experience once you have gone home.    Many things will contribute to unusual feelings during the postpartum period:   -Feelings of anxiety about caring for your new baby   -Hormone changes   -Lack of sleep    Feelings of being overwhelmed, exhausted and out of your element are very normal. Be patient with yourself, this is a normal process of adjustment and you will get it with time.    You may be surprised at how fragile, alone, and overwhelmed you feel. About 70% of women have baby blues after childbirth. If you feel that your sadness is progressing to extreme sadness to a point of being unable to care for yourself or your baby, you may have postpartum depression. Counseling with a qualified therapist can be very helpful. If you do not know who to call or where to turn, call your physician.    Postpartum pain control:  You may experience cramping for up to 1-2 weeks, particularly while breast feeding.     -Start your day by taking up to 2-4 tabs of regular strength ibuprofen (400-800mg), continue every 6 hours as needed for pain.    -You can additionally take 1-2 tabs of tylenol (325-650mg regular strength or 500-1000mg extra strength), every 6 hours for additional pain control as needed. Take no more than 4,000 mg of tylenol in 24 hours.     It is best to alternate your medications so you are taking something every 3 hours if you are having continued pain.    For example:  6am: ibuprofen 3 tabs (800 mg)   9am: tylenol 1000 mg  12pm: ibuprofen 800 mg  3pm: tylenol 1000 mg  6pm: ibuprofen 800 mg  9pm: tylenol 1000 mg  12am: ibuprofen 800 mg    If you have vaginal pain you can take sitz baths 1-2x/day. Fill the tub with 2-3 inches of warm water and soak the perineal and vaginal area for 10 minutes. This can be done 3-4 times a day. Ice or warm packs can also be  applied for comfort.    Activity:    Rest as much as possible, limit visitors and take frequent naps at first    Do not lift anything heavier than your baby plus carrier for 3-4 weeks after a vaginal delivery.      Showers are fine, no baths for two weeks.    If your bleeding increases or is bright red, you are doing too much.       Walk and climb stairs as tolerated     Avoid exercising for at least three weeks after a vaginal delivery and six weeks after a . Progress activity slowly.    Driving is not recommended for the first two weeks after your delivery; more restriction may apply. You should never drive if you are taking prescription pain medication like Oxycodone or Norco.    Avoid intercourse for four to six weeks after delivery. Beware that severe vaginal dryness may cause significant discomfort with intercourse. This is normal, but can be unpleasant. Using Jelly/Astroglide/Replens or similar may be needed. Remember to use reliable contraception as pregnancy is possible (even when breastfeeding)!    Diet:    Drink at least eight glasses of water each day, especially if you are breastfeeding.    If breastfeeding, you will need 330kcal/day more than non-lactating women.     If not breastfeeding, you may resume your pre-pregnancy diet.    Continue taking in good calcium - at least four servings of calcium fortified foods per day.     Constipation:  You may use the following products to ease constipation:    1. Stool softeners such as metamucil or benefiber (over the counter)  2. Senna 1-2 times daily  3. Fiber supplements (over the counter)  4. Miralax (over the counter)  5. Colace  6. Dulcolax      Please be sure to keep adequately hydrated: 6-8 8oz glasses daily, more if needed to compensate for exercise, sweating, etc.      More specific dosing for constipation medications can be found below:    - Metamucil 28g daily PO with 8oz of water  - Senna-docusate 8.6-50 MG per tablet PO 1 tablet, Oral, 2  TIMES DAILY, Start with 1 tablet PO BID, reduce to 1 tablet daily when having daily BMs. Stop for loose stools.  - Docusate sodium (COLACE) capsule 100 mg, Oral, 2 TIMES DAILY. Stop for loose stools.  - Bisacodyl (DULCOLAX) suppository 10 mg, Rectal, DAILY PRN. Stop for loose stools.    Other Medications:  Keep taking your prenatal vitamin until you stop breastfeeding; if not breastfeeding, may stop one month after the delivery.  If you were on an iron supplement at the end of your pregnancy or after delivery, continue this for twelve weeks after delivery (or until prescription gone).    Stitches:    Your stitches will dissolve by themselves - both  and episiotomy/vaginal stitches.      Use the pericare bottle given to you in the hospital to rinse off your bottom after using the bathroom and gently pat dry with tissue.    For  incisions, keep the site clean and dry. If there are steri-strips on your incision, you may remove them one week after your surgery     You may shower as often as you desire after delivery - either a vaginal or a  delivery.    Keep the incision open to air; no bandage needs to be kept on the incision.    If there is a small amount of liquid drainage - clear, red, white - this is normal and may be seen now and then. If there is a large amount of  drainage, increasing pain or redness of the incision and/or fever, call the office to be seen.      WHEN TO CALL-      Fever: 100.4 degrees or higher      Nausea and vomiting       Frequent and painful urination       Bleeding heavier than 1 pad per hour                                 Increasing pelvic/abdominal pain not controlled with your medications      Red, tender, painful area on the breast       Persistent perineal pain with increasing intensity                         Foul smelling discharge (increased volume of discharge is normal)      Pain, swelling, and tenderness in leg       Chest pain and cough       Severe  headache, especially with visual changes    Please contact the clinic with any questions.  Please schedule a follow up appointment with your primary OB/Gyn provider in 6 weeks or sooner if you have any problems.   You can contact the clinic via [facility] or call [facility] at [phone].    Schedule your well baby visit as directed by the pediatrician or family practice physician.    Postpartum Vaginal Delivery Instructions    Follow up in clinic in 6 weeks post partum.        Activity       Ask family and friends for help when you need it.    Do not place anything in your vagina for 6 weeks.    You are not restricted on other activities, but take it easy for a few weeks to allow your body to recover from delivery.  You are able to do any activities you feel up to that point.    No driving until you have stopped taking your pain medications (usually two weeks after delivery).     Call your health care provider if you have any of these symptoms:       Increased pain, swelling, redness, or fluid around your stiches from an episiotomy or perineal tear.    A fever above 100.4 F (38 C) with or without chills when placing a thermometer under your tongue.    You soak a sanitary pad with blood within 1 hour, or you see blood clots larger than a golf ball.    Bleeding that lasts more than 6 weeks.    Vaginal discharge that smells bad.    Severe pain, cramping or tenderness in your lower belly area.    A need to urinate more frequently (use the toilet more often), more urgently (use the toilet very quickly), or it burns when you urinate.    Nausea and vomiting.    Redness, swelling or pain around a vein in your leg.    Problems breastfeeding or a red or painful area on your breast.    Chest pain and cough or are gasping for air.    Problems coping with sadness, anxiety, or depression.  If you have any concerns about hurting yourself or the baby, call your provider immediately.     You have questions or concerns after you  "return home.     Keep your hands clean:  Always wash your hands before touching your perineal area and stitches.  This helps reduce your risk of infection.  If your hands aren't dirty, you may use an alcohol hand-rub to clean your hands. Keep your nails clean and short.        Pending Results     No orders found from 2018 to 2018.            Statement of Approval     Ordered          18 0914  I have reviewed and agree with all the recommendations and orders detailed in this document.  EFFECTIVE NOW     Approved and electronically signed by:  Amalia Sarkar DO             Admission Information     Date & Time Provider Department Dept. Phone    2018 Jennifer Justin MD [facility] Postpartum [phone]      Your Vitals Were     Blood Pressure Pulse Temperature Respirations Height Weight    120/75 74 98.1  F (36.7  C) (Oral) 16 1.549 m (5' 1\") 68 kg (150 lb)    Last Period BMI (Body Mass Index)                2017 28.34 kg/m2          MyChart Information     [employer] lets you send messages to your doctor, view your test results, renew your prescriptions, schedule appointments and more. To sign up, go to [email or web address] . Click on \"Log in\" on the left side of the screen, which will take you to the Welcome page. Then click on \"Sign up Now\" on the right side of the page.     You will be asked to enter the access code listed below, as well as some personal information. Please follow the directions to create your username and password.     Your access code is: [email or web address]  Expires: 2018 12:31 PM     Your access code will  in 90 days. If you need help or a new code, please call your [facility] clinic or [phone].        Care EveryWhere ID     This is your Care EveryWhere ID. This could be used by other organizations to access your [facility] medical records  [id number]        Equal Access to Services     [facility] CHAU AH: katharine Barnes, " faye ramosaan ah. So [facility] [phone].    ATENCIÓN: Si lisa ag, tiene a aguilar disposición servicios gratuitos de asistencia lingüística. Aristeo al [phone].    We comply with applicable federal civil rights laws and [place] laws. We do not discriminate on the basis of race, color, national origin, age, disability, sex, sexual orientation, or gender identity.               Review of your medicines      START taking        Dose / Directions    ferrous sulfate 325 (65 FE) MG tablet   Commonly known as:  IRON   Used for:  Iron deficiency anemia, unspecified iron deficiency anemia type        Dose:  325 mg   Take 1 tablet (325 mg) by mouth 3 times daily (with meals)   Quantity:  60 tablet   Refills:  2       ibuprofen 800 MG tablet   Commonly known as:  ADVIL/MOTRIN        Dose:  800 mg   Take 1 tablet (800 mg) by mouth every 6 hours as needed for moderate pain   Quantity:  30 tablet   Refills:  1       ondansetron 4 MG tablet   Commonly known as:  ZOFRAN   Used for:  Iron deficiency anemia, unspecified iron deficiency anemia type        Dose:  4 mg   Take 1 tablet (4 mg) by mouth every 8 hours as needed for nausea   Quantity:  18 tablet   Refills:  1         CONTINUE these medicines which have NOT CHANGED        Dose / Directions    prenatal multivitamin plus iron 27-0.8 MG Tabs per tablet        Dose:  1 tablet   Take 1 tablet by mouth daily   Refills:  0            Where to get your medicines      Some of these will need a paper prescription and others can be bought over the counter. Ask your nurse if you have questions.     Bring a paper prescription for each of these medications     ferrous sulfate 325 (65 FE) MG tablet    ibuprofen 800 MG tablet    ondansetron 4 MG tablet                Protect others around you: Learn how to safely use, store and throw away your medicines at www.disposemymeds.org.             Medication List: This is a list of all your  medications and when to take them. Check marks below indicate your daily home schedule. Keep this list as a reference.      Medications           Morning Afternoon Evening Bedtime As Needed    ferrous sulfate 325 (65 FE) MG tablet   Commonly known as:  IRON   Take 1 tablet (325 mg) by mouth 3 times daily (with meals)                                ibuprofen 800 MG tablet   Commonly known as:  ADVIL/MOTRIN   Take 1 tablet (800 mg) by mouth every 6 hours as needed for moderate pain   Last time this was given:  600 mg on 2/8/2018  9:58 PM                                ondansetron 4 MG tablet   Commonly known as:  ZOFRAN   Take 1 tablet (4 mg) by mouth every 8 hours as needed for nausea                                prenatal multivitamin plus iron 27-0.8 MG Tabs per tablet   Take 1 tablet by mouth daily   Last time this was given:  1 tablet on 2/9/2018  7:56 AM

## 2018-02-07 NOTE — ANESTHESIA PREPROCEDURE EVALUATION
PAC NOTE:       ANESTHESIA PRE EVALUATION:  Anesthesia Evaluation       history and physical reviewed .             ROS/MED HX    ENT/Pulmonary:  - neg pulmonary ROS     Neurologic:  - neg neurologic ROS     Cardiovascular:  - neg cardiovascular ROS       METS/Exercise Tolerance:     Hematologic:         Musculoskeletal:         GI/Hepatic:  - neg GI/hepatic ROS       Renal/Genitourinary:         Endo:         Psychiatric:         Infectious Disease:         Malignancy:         Other:                     Physical Exam  Normal systems: cardiovascular, pulmonary and dental    Airway   Mallampati: II    Dental     Cardiovascular       Pulmonary     Other findings: .Lab Test        01/04/18 01/03/18 08/03/17 07/19/17                       1653          1530          1930          1019          WBC           --          10.9         12.4*        9.4           HGB          7.9*         7.5*         11.0*        11.2*         MCV           --          79           79           81            PLT           --          283          367          396            No lab results found.    neg OB ROS            Anesthesia Plan  Procedure only, no anesthetic delivered    History & Physical Review      ASA Status:  2 .  OB Epidural Asa: 2       Plan for Epidural          Postoperative Care      Consents  Anesthetic plan, risks, benefits and alternatives discussed with:  Patient..                            .

## 2018-02-07 NOTE — PROVIDER NOTIFICATION
02/07/18 1444   Provider Notification   Provider Name/Title Dr Hudson   Method of Notification At Bedside   Notification Reason Labor Status   Updated  and that pt still needs to push more and will call for delivery.

## 2018-02-08 PROCEDURE — 12000027 ZZH R&B OB

## 2018-02-08 PROCEDURE — 25000132 ZZH RX MED GY IP 250 OP 250 PS 637: Performed by: OBSTETRICS & GYNECOLOGY

## 2018-02-08 PROCEDURE — 40000083 ZZH STATISTIC IP LACTATION SERVICES 1-15 MIN

## 2018-02-08 RX ORDER — PRENATAL VIT/IRON FUM/FOLIC AC 27MG-0.8MG
1 TABLET ORAL DAILY
Status: DISCONTINUED | OUTPATIENT
Start: 2018-02-08 | End: 2018-02-09 | Stop reason: HOSPADM

## 2018-02-08 RX ADMIN — SENNOSIDES AND DOCUSATE SODIUM 1 TABLET: 8.6; 5 TABLET ORAL at 09:21

## 2018-02-08 RX ADMIN — IBUPROFEN 600 MG: 600 TABLET ORAL at 21:58

## 2018-02-08 RX ADMIN — PRENATAL VIT W/ FE FUMARATE-FA TAB 27-0.8 MG 1 TABLET: 27-0.8 TAB at 09:21

## 2018-02-08 RX ADMIN — IBUPROFEN 600 MG: 600 TABLET ORAL at 15:29

## 2018-02-08 RX ADMIN — IBUPROFEN 600 MG: 600 TABLET ORAL at 09:21

## 2018-02-08 RX ADMIN — ACETAMINOPHEN 650 MG: 325 TABLET, FILM COATED ORAL at 02:45

## 2018-02-08 NOTE — PLAN OF CARE
Problem: Patient Care Overview  Goal: Plan of Care/Patient Progress Review  Outcome: No Change  VSS, up and moving well, first time went to the bathroom with assistance of one, denies legs' numbness, breast feeding, infant starting to cluster feed, and pt asked about formula, talked about starting hand expression, brought couple spoons, mentioned donor milk availability; oriented to room, cramping pain is controlled with ibuprofen.

## 2018-02-08 NOTE — ANESTHESIA POSTPROCEDURE EVALUATION
Patient: Kristi French      S/P epidural for labor.   I or my partner was immediately available for management of this patient during epidural analgesia infusion.  VSS.  Doing well. Block resolved.  Neuro at baseline. Denies positional headache. Minimal side effects easily managed w/ PRN meds. No apparent anesthetic complications. No follow-up required.    Allan Irene MD    Note:  Anesthesia Post Evaluation    Last vitals:  Vitals:    02/07/18 1659 02/07/18 2020 02/08/18 0245   BP: 131/88 119/85 121/69   Pulse:  69 70   Resp:  18 18   Temp:  98.7  F (37.1  C) 98.7  F (37.1  C)         Electronically Signed By: Allan Irene MD  February 8, 2018  9:06 AM

## 2018-02-08 NOTE — PLAN OF CARE
Problem: Patient Care Overview  Goal: Plan of Care/Patient Progress Review  Outcome: No Change  Pt meeting expected goals for the shift. Tylenol given for pain. Breast and bottle feeding per her plan.

## 2018-02-08 NOTE — PLAN OF CARE
Problem: Patient Care Overview  Goal: Plan of Care/Patient Progress Review  Outcome: Improving  Patient doing well today. Bonding well with  and breastfeeding with some assistance. Encouragement and education provided regarding breastfeeding. Left nipple painful with latch and mother using mostly right to breastfeed, but skin intact, no redness noted. LC visited today. Independent with self and  cares.

## 2018-02-08 NOTE — PROGRESS NOTES
[facility]   Post-Partum Progress Note          Assessment and Plan:    Assessment:   Post-partum day #1  Normal spontaneous vaginal delivery  L&D complications: None      Doing well.  Pain well-controlled.      Plan:   Ambulation encouraged  Anticipate discharge tomorrow           Interval History:   Doing well.  Pain is well-controlled.  No fevers.  No history of foul-smelling vaginal discharge.  Good appetite.  Denies chest pain, shortness of breath, nausea or vomiting.  Vaginal bleeding is similar to a heavy menstrual flow.  Ambulatory.  Breastfeeding well.          Significant Problems:    None          Review of Systems:    C: NEGATIVE for fever, chills, change in weight  I: NEGATIVE for worrisome rashes, moles or lesions  E: NEGATIVE for vision changes or irritation  E/M: NEGATIVE for ear, mouth and throat problems  R: NEGATIVE for significant cough or SOB  B: NEGATIVE for masses, tenderness or discharge  CV: NEGATIVE for chest pain, palpitations or peripheral edema  GI: NEGATIVE for nausea, abdominal pain, heartburn, or change in bowel habits  : NEGATIVE for frequency, dysuria, or hematuria  M: NEGATIVE for significant arthralgias or myalgia  N: NEGATIVE for weakness, dizziness or paresthesias  E: NEGATIVE for temperature intolerance, skin/hair changes  H: NEGATIVE for bleeding problems  P: NEGATIVE for changes in mood or affect          Medications:   All medications related to the patient's surgery have been reviewed  -          Physical Exam:     All vitals stable  Patient Vitals for the past 8 hrs:   BP Temp Temp src Pulse Resp   02/08/18 0245 121/69 98.7  F (37.1  C) Oral 70 18     Uterine fundus is firm, non-tender and at the level of the umbilicus          Data:     All laboratory data related to this surgery reviewed  Hemoglobin   Date Value Ref Range Status   01/04/2018 7.9 (L) 11.7 - 15.7 g/dL Final     Comment:     Results confirmed by repeat test  Critical Value called to and read  back by   [id number] AT 1735 BY MAEVE     01/03/2018 7.5 (L) 11.7 - 15.7 g/dL Final     Comment:     Results confirmed by repeat test  Critical Value called to and read back by  WIN BELLAMY [id number] AT 1554 BY MAEVE     08/03/2017 11.0 (L) 11.7 - 15.7 g/dL Final   07/19/2017 11.2 (L) 11.7 - 15.7 g/dL Final   02/08/2012 12.6 11.7 - 15.7 g/dL Final     -    Olamide Thompson DO

## 2018-02-08 NOTE — CONSULTS
D: MARYLOU responding to MD consult.  Kristi French is a 22 yr old SPK, FOB is [place].  She has been living independently in an apartment, however will now be staying with her parents in [place].  Her  son, Nabeel, is joined by her 6 yr old son, Jaden, who has a different father.  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  She does an overnight shift 7 days on, 7 days off.  She was unsure of her paid and unpaid time off benefit and will be moving back to her parents for more financial security and assistance.  Anthony is currently living in a half-way house due to violating probation and will be moving in March to his parents, who live in [place]. She became tear-full when speaking about the challenges she encounters when dealing with Jaden's father who recently began paying a very limited amount of financial support. She is prepared for Nabeel, is planning to enroll in WIC.  She states she did not experience postpartum depression after Jaden was born and was receptive to the discussion and resources provided.  Also provided her with the Resources for Parents brochure with community resources, as well as counseling resources.  She will continue medical care for herself and her children at the [facility].  A: Kristi is receptive to  intervention and resources.  She is prepared for her baby at home, will be moving to her parents for financial and emotional support.  Observed maternal/infant bonding.  Could benefit from brief counseling related to Jaden's father.    P: MARYLOU completed consult.

## 2018-02-08 NOTE — LACTATION NOTE
LC to see patient and assess latch.  She was nursing baby on the left.  Minor repositioning done to move baby closer.  Baby had an active suck pattern and occasional swallows.  She has been formula supplementing but no medical indicators.  LC reviewed the risks of early supplementation, benefits of breastfeeding and colostrum, and encouraged her to stimulate her milk production if she continues to offer formula.  No other concerns noted.  LC also reviewed pumping when she returns to work.

## 2018-02-09 VITALS
TEMPERATURE: 98 F | HEIGHT: 61 IN | RESPIRATION RATE: 16 BRPM | DIASTOLIC BLOOD PRESSURE: 70 MMHG | BODY MASS INDEX: 28.32 KG/M2 | SYSTOLIC BLOOD PRESSURE: 118 MMHG | HEART RATE: 74 BPM | WEIGHT: 150 LBS

## 2018-02-09 LAB
ERYTHROCYTE [DISTWIDTH] IN BLOOD BY AUTOMATED COUNT: 24.8 % (ref 10–15)
HCT VFR BLD AUTO: 32.5 % (ref 35–47)
HGB BLD-MCNC: 10.1 G/DL (ref 11.7–15.7)
MCH RBC QN AUTO: 25.1 PG (ref 26.5–33)
MCHC RBC AUTO-ENTMCNC: 31.1 G/DL (ref 31.5–36.5)
MCV RBC AUTO: 81 FL (ref 78–100)
PLATELET # BLD AUTO: 231 10E9/L (ref 150–450)
RBC # BLD AUTO: 4.02 10E12/L (ref 3.8–5.2)
WBC # BLD AUTO: 9.3 10E9/L (ref 4–11)

## 2018-02-09 PROCEDURE — 85027 COMPLETE CBC AUTOMATED: CPT | Performed by: OBSTETRICS & GYNECOLOGY

## 2018-02-09 PROCEDURE — 25000132 ZZH RX MED GY IP 250 OP 250 PS 637: Performed by: OBSTETRICS & GYNECOLOGY

## 2018-02-09 PROCEDURE — 36415 COLL VENOUS BLD VENIPUNCTURE: CPT | Performed by: OBSTETRICS & GYNECOLOGY

## 2018-02-09 PROCEDURE — 90686 IIV4 VACC NO PRSV 0.5 ML IM: CPT | Performed by: OBSTETRICS & GYNECOLOGY

## 2018-02-09 PROCEDURE — 25000128 H RX IP 250 OP 636: Performed by: OBSTETRICS & GYNECOLOGY

## 2018-02-09 RX ORDER — ONDANSETRON 4 MG/1
4 TABLET, FILM COATED ORAL EVERY 8 HOURS PRN
Qty: 18 TABLET | Refills: 1 | Status: SHIPPED | OUTPATIENT
Start: 2018-02-09 | End: 2019-02-09

## 2018-02-09 RX ORDER — FERROUS SULFATE 325(65) MG
325 TABLET ORAL
Qty: 60 TABLET | Refills: 2 | Status: SHIPPED | OUTPATIENT
Start: 2018-02-09 | End: 2019-02-09

## 2018-02-09 RX ORDER — IBUPROFEN 800 MG/1
800 TABLET, FILM COATED ORAL EVERY 6 HOURS PRN
Qty: 30 TABLET | Refills: 1 | Status: SHIPPED | OUTPATIENT
Start: 2018-02-09 | End: 2019-02-09

## 2018-02-09 RX ADMIN — INFLUENZA A VIRUS A/MICHIGAN/45/2015 X-275 (H1N1) ANTIGEN (FORMALDEHYDE INACTIVATED), INFLUENZA A VIRUS A/HONG KONG/4801/2014 X-263B (H3N2) ANTIGEN (FORMALDEHYDE INACTIVATED), INFLUENZA B VIRUS B/PHUKET/3073/2013 ANTIGEN (FORMALDEHYDE INACTIVATED), AND INFLUENZA B VIRUS B/BRISBANE/60/2008 ANTIGEN (FORMALDEHYDE INACTIVATED) 0.5 ML: 15; 15; 15; 15 INJECTION, SUSPENSION INTRAMUSCULAR at 11:16

## 2018-02-09 RX ADMIN — PRENATAL VIT W/ FE FUMARATE-FA TAB 27-0.8 MG 1 TABLET: 27-0.8 TAB at 07:56

## 2018-02-09 NOTE — PLAN OF CARE
Dr. Sarkar in to see patient this AM, discharge order placed. Medications sent home with patient. Videos watched. Seen by social work prior to discharge. All education completed with JACOB Hernández. No further questions at this time. Patient waiting for FOB to arrive to sign ROP. Will discharge once FOB arrives.

## 2018-02-09 NOTE — PLAN OF CARE
Problem: Patient Care Overview  Goal: Plan of Care/Patient Progress Review  Outcome: Improving  Stable patient meeting expected goals. Declined pain medications this AM. Using t-pump and lanolin for comfort. Breastfeeding infant and supplementing with both EBM and formula per her preference. Independent with self and  cares. Preparing for discharge.

## 2018-02-09 NOTE — PLAN OF CARE
Problem: Patient Care Overview  Goal: Plan of Care/Patient Progress Review  Outcome: Improving  Patient verbalized readiness to go today. Independent with self cares, taking Ibuprofen and receiving relief from cramping pains. Independent and attentive to infant needs, breastfeeding and then offering formula per bottle per her preference. Did start pumping last evening and was able to pump 35cc total. Using lanolin cream for sore nipples as states infant nursing often. Reinforced need to complete birth certificate and depression scale prior to going home. Encouraged rest when able. Monitor.

## 2018-02-09 NOTE — PROGRESS NOTES
[facility]   Post-Partum Progress Note          Assessment and Plan:    Assessment:   Post-partum day #2  Normal spontaneous vaginal delivery  Chronic Anemia  L&D complications: None      Doing well.  Pain well-controlled.      Plan:   Ambulation encouraged  Anticipate discharge tomorrow  Repeat CBC this AM  Ferrous sulfate TID, Zofran Rx given - take 30 mins prior           Interval History:   Doing well.  Pain is well-controlled.  No fevers.  No history of foul-smelling vaginal discharge.  Good appetite.  Denies chest pain, shortness of breath, nausea or vomiting.  Vaginal bleeding is similar to a heavy menstrual flow.  Ambulatory.  Breastfeeding well.          Significant Problems:   Anemia          Review of Systems:    C: NEGATIVE for fever, chills, change in weight  I: NEGATIVE for worrisome rashes, moles or lesions  E: NEGATIVE for vision changes or irritation  E/M: NEGATIVE for ear, mouth and throat problems  R: NEGATIVE for significant cough or SOB  B: NEGATIVE for masses, tenderness or discharge  CV: NEGATIVE for chest pain, palpitations or peripheral edema  GI: NEGATIVE for nausea, abdominal pain, heartburn, or change in bowel habits  : NEGATIVE for frequency, dysuria, or hematuria  M: NEGATIVE for significant arthralgias or myalgia  N: NEGATIVE for weakness, dizziness or paresthesias  E: NEGATIVE for temperature intolerance, skin/hair changes  H: NEGATIVE for bleeding problems  P: NEGATIVE for changes in mood or affect          Medications:   All medications related to the patient's surgery have been reviewed  -          Physical Exam:     All vitals stable  Patient Vitals for the past 8 hrs:   Resp   02/09/18 0600 16     Uterine fundus is firm, non-tender and at the level of the umbilicus          Data:     All laboratory data related to this surgery reviewed  Hemoglobin   Date Value Ref Range Status   01/04/2018 7.9 (L) 11.7 - 15.7 g/dL Final     Comment:     Results confirmed by repeat  test  Critical Value called to and read back by   [id number] AT 1735 BY AA     01/03/2018 7.5 (L) 11.7 - 15.7 g/dL Final     Comment:     Results confirmed by repeat test  Critical Value called to and read back by  WIN BELLAMY [id number] AT 1554 BY AA     08/03/2017 11.0 (L) 11.7 - 15.7 g/dL Final   07/19/2017 11.2 (L) 11.7 - 15.7 g/dL Final   02/08/2012 12.6 11.7 - 15.7 g/dL Final     -    Amalia Sarkar DO

## 2018-02-09 NOTE — DISCHARGE INSTRUCTIONS
Post Partum Discharge Instructions after a Vaginal Delivery    What to expect:   No matter how well you feel when you get home, these first few days at home are for rest and recuperation. You can be surprised by how much rest it takes to fully recover from the delivery and how little extra time there is for tasks other than eating, sleeping and caring for your . Limit your activities to caring for yourself and your baby; eat, rest and relax. Let others clean, cook and supervise household needs (including older siblings). You will tire more quickly and this can be emotionally draining in a new situation without giving yourself a chance to recharge regularly.      Physical Changes:    Breast milk comes in within three to six days after delivery. When breast milk comes in, it is not unusual to get a low grade fever (max 101 F) and very hard, sensitive breasts with or without leaking.    Spotting or bleeding, like a period, on and off for six to eight weeks after delivery. Pattern of bleeding varies during the recovery period and will continue to vary with or without breastfeeding. Passing small to medium clots on occasion is also a normal experience.    Menstrual cramping may be more noticeable during and immediately following breastfeeding.    Legs and feet often swell more after delivery than before delivery; this usually returns to normal within two weeks postpartum.    Constipation is a common complaint - this can lead to significant abdominal pain and cramping. Drink plenty of fluids, eat fresh fruit and vegetables, as well as plenty of fiber.    Breast care:  Breastfeeding:    Breastfeeding can be difficult; learning how to breastfeed takes time and patience.     Your milk should come in 3-6 days after delivery. If you are not drinking enough fluids or had an unusual amount of blood loss at delivery, it may take a few days longer.    During the first few days the baby is getting colostrum- this is high in  antibodies which are good for the baby.    After nursing, remember to express a little breast milk and rub it on your nipples and allow them to air dry for 10-15 minutes after each feeding. You may also use pure lanolin or vitamin E oil or cream if you prefer.     If your breasts become engorged, try warm compresses or a warm shower, followed by feeding the baby or expressing milk to relieve the discomfort.    Bottle feeding:    Wear a tight fitting bra or bind your breasts with an ACE bandage. Ice packs will help relieve the soreness. Do not try to express the milk as this will only stimulate the production of more milk.    Emotions:    There are a wide range of emotions you may experience once you have gone home.    Many things will contribute to unusual feelings during the postpartum period:   -Feelings of anxiety about caring for your new baby   -Hormone changes   -Lack of sleep    Feelings of being overwhelmed, exhausted and out of your element are very normal. Be patient with yourself, this is a normal process of adjustment and you will get it with time.    You may be surprised at how fragile, alone, and overwhelmed you feel. About 70% of women have baby blues after childbirth. If you feel that your sadness is progressing to extreme sadness to a point of being unable to care for yourself or your baby, you may have postpartum depression. Counseling with a qualified therapist can be very helpful. If you do not know who to call or where to turn, call your physician.    Postpartum pain control:  You may experience cramping for up to 1-2 weeks, particularly while breast feeding.     -Start your day by taking up to 2-4 tabs of regular strength ibuprofen (400-800mg), continue every 6 hours as needed for pain.    -You can additionally take 1-2 tabs of tylenol (325-650mg regular strength or 500-1000mg extra strength), every 6 hours for additional pain control as needed. Take no more than 4,000 mg of tylenol in 24  hours.     It is best to alternate your medications so you are taking something every 3 hours if you are having continued pain.    For example:  6am: ibuprofen 3 tabs (800 mg)   9am: tylenol 1000 mg  12pm: ibuprofen 800 mg  3pm: tylenol 1000 mg  6pm: ibuprofen 800 mg  9pm: tylenol 1000 mg  12am: ibuprofen 800 mg    If you have vaginal pain you can take sitz baths 1-2x/day. Fill the tub with 2-3 inches of warm water and soak the perineal and vaginal area for 10 minutes. This can be done 3-4 times a day. Ice or warm packs can also be applied for comfort.    Activity:    Rest as much as possible, limit visitors and take frequent naps at first    Do not lift anything heavier than your baby plus carrier for 3-4 weeks after a vaginal delivery.      Showers are fine, no baths for two weeks.    If your bleeding increases or is bright red, you are doing too much.       Walk and climb stairs as tolerated     Avoid exercising for at least three weeks after a vaginal delivery and six weeks after a . Progress activity slowly.    Driving is not recommended for the first two weeks after your delivery; more restriction may apply. You should never drive if you are taking prescription pain medication like Oxycodone or Norco.    Avoid intercourse for four to six weeks after delivery. Beware that severe vaginal dryness may cause significant discomfort with intercourse. This is normal, but can be unpleasant. Using Jelly/Astroglide/Replens or similar may be needed. Remember to use reliable contraception as pregnancy is possible (even when breastfeeding)!    Diet:    Drink at least eight glasses of water each day, especially if you are breastfeeding.    If breastfeeding, you will need 330kcal/day more than non-lactating women.     If not breastfeeding, you may resume your pre-pregnancy diet.    Continue taking in good calcium - at least four servings of calcium fortified foods per day.     Constipation:  You may use the following  products to ease constipation:    1. Stool softeners such as metamucil or benefiber (over the counter)  2. Senna 1-2 times daily  3. Fiber supplements (over the counter)  4. Miralax (over the counter)  5. Colace  6. Dulcolax      Please be sure to keep adequately hydrated: 6-8 8oz glasses daily, more if needed to compensate for exercise, sweating, etc.      More specific dosing for constipation medications can be found below:    - Metamucil 28g daily PO with 8oz of water  - Senna-docusate 8.6-50 MG per tablet PO 1 tablet, Oral, 2 TIMES DAILY, Start with 1 tablet PO BID, reduce to 1 tablet daily when having daily BMs. Stop for loose stools.  - Docusate sodium (COLACE) capsule 100 mg, Oral, 2 TIMES DAILY. Stop for loose stools.  - Bisacodyl (DULCOLAX) suppository 10 mg, Rectal, DAILY PRN. Stop for loose stools.    Other Medications:  Keep taking your prenatal vitamin until you stop breastfeeding; if not breastfeeding, may stop one month after the delivery.  If you were on an iron supplement at the end of your pregnancy or after delivery, continue this for twelve weeks after delivery (or until prescription gone).    Stitches:    Your stitches will dissolve by themselves - both  and episiotomy/vaginal stitches.      Use the pericare bottle given to you in the hospital to rinse off your bottom after using the bathroom and gently pat dry with tissue.    For  incisions, keep the site clean and dry. If there are steri-strips on your incision, you may remove them one week after your surgery     You may shower as often as you desire after delivery - either a vaginal or a  delivery.    Keep the incision open to air; no bandage needs to be kept on the incision.    If there is a small amount of liquid drainage - clear, red, white - this is normal and may be seen now and then. If there is a large amount of  drainage, increasing pain or redness of the incision and/or fever, call the office to be  seen.      WHEN TO CALL-      Fever: 100.4 degrees or higher      Nausea and vomiting       Frequent and painful urination       Bleeding heavier than 1 pad per hour                                 Increasing pelvic/abdominal pain not controlled with your medications      Red, tender, painful area on the breast       Persistent perineal pain with increasing intensity                         Foul smelling discharge (increased volume of discharge is normal)      Pain, swelling, and tenderness in leg       Chest pain and cough       Severe headache, especially with visual changes    Please contact the clinic with any questions.  Please schedule a follow up appointment with your primary OB/Gyn provider in 6 weeks or sooner if you have any problems.   You can contact the clinic via [employer] or call [facility] at [phone].    Schedule your well baby visit as directed by the pediatrician or family practice physician.    Postpartum Vaginal Delivery Instructions    Follow up in clinic in 6 weeks post partum.        Activity       Ask family and friends for help when you need it.    Do not place anything in your vagina for 6 weeks.    You are not restricted on other activities, but take it easy for a few weeks to allow your body to recover from delivery.  You are able to do any activities you feel up to that point.    No driving until you have stopped taking your pain medications (usually two weeks after delivery).     Call your health care provider if you have any of these symptoms:       Increased pain, swelling, redness, or fluid around your stiches from an episiotomy or perineal tear.    A fever above 100.4 F (38 C) with or without chills when placing a thermometer under your tongue.    You soak a sanitary pad with blood within 1 hour, or you see blood clots larger than a golf ball.    Bleeding that lasts more than 6 weeks.    Vaginal discharge that smells bad.    Severe pain, cramping or tenderness in your lower belly  area.    A need to urinate more frequently (use the toilet more often), more urgently (use the toilet very quickly), or it burns when you urinate.    Nausea and vomiting.    Redness, swelling or pain around a vein in your leg.    Problems breastfeeding or a red or painful area on your breast.    Chest pain and cough or are gasping for air.    Problems coping with sadness, anxiety, or depression.  If you have any concerns about hurting yourself or the baby, call your provider immediately.     You have questions or concerns after you return home.     Keep your hands clean:  Always wash your hands before touching your perineal area and stitches.  This helps reduce your risk of infection.  If your hands aren't dirty, you may use an alcohol hand-rub to clean your hands. Keep your nails clean and short.

## 2018-02-09 NOTE — PLAN OF CARE
Problem: Patient Care Overview  Goal: Plan of Care/Patient Progress Review  Outcome: No Change  VSS, cramping pain is controlled with ibuprofen, ice, and T-pump, pt stated decreased pain after interventions, breast feeding and supplementing her infant with formula per pt's request.

## 2018-03-12 ENCOUNTER — OFFICE VISIT (OUTPATIENT)
Dept: INTERNAL MEDICINE | Facility: CLINIC | Age: 23
End: 2018-03-12
Payer: COMMERCIAL

## 2018-03-12 VITALS
TEMPERATURE: 98 F | HEART RATE: 104 BPM | DIASTOLIC BLOOD PRESSURE: 64 MMHG | HEIGHT: 61 IN | BODY MASS INDEX: 23.75 KG/M2 | RESPIRATION RATE: 16 BRPM | OXYGEN SATURATION: 98 % | WEIGHT: 125.8 LBS | SYSTOLIC BLOOD PRESSURE: 104 MMHG

## 2018-03-12 DIAGNOSIS — R76.11 POSITIVE PPD: ICD-10-CM

## 2018-03-12 DIAGNOSIS — Z11.1 SCREENING EXAMINATION FOR PULMONARY TUBERCULOSIS: Primary | ICD-10-CM

## 2018-03-12 PROCEDURE — 99213 OFFICE O/P EST LOW 20 MIN: CPT | Performed by: PHYSICIAN ASSISTANT

## 2018-03-12 PROCEDURE — 86580 TB INTRADERMAL TEST: CPT | Performed by: PHYSICIAN ASSISTANT

## 2018-03-12 ASSESSMENT — PAIN SCALES - GENERAL: PAINLEVEL: NO PAIN (0)

## 2018-03-12 NOTE — PROGRESS NOTES
"  SUBJECTIVE:   Kristi French is a 22 year old female who presents to clinic today for the following health issues:      Pt is here to get a tb testing done for work.   Patient applying for PCA position.  No hx of positive PPD or TB testing in the past.   No current illness or coughing.     Followed by Gyn       -------------------------------------    Problem list and histories reviewed & adjusted, as indicated.  Additional history: as documented    Labs reviewed in EPIC    Reviewed and updated as needed this visit by clinical staff  Tobacco  Allergies  Meds       Reviewed and updated as needed this visit by Provider  Tobacco  Allergies  Meds  Soc Hx        ROS:  Constitutional, HEENT, cardiovascular, pulmonary, gi and gu systems are negative, except as otherwise noted.    OBJECTIVE:     /64 (BP Location: Left arm, Patient Position: Chair, Cuff Size: Adult Regular)  Pulse 104  Temp 98  F (36.7  C) (Oral)  Resp 16  Ht 5' 1\" (1.549 m)  Wt 125 lb 12.8 oz (57.1 kg)  LMP 05/02/2017  SpO2 98%  BMI 23.77 kg/m2  Body mass index is 23.77 kg/(m^2).  GENERAL: healthy, alert and no distress  RESP: lungs clear to auscultation - no rales, rhonchi or wheezes  CV: regular rate and rhythm, normal S1 S2, no S3 or S4, no murmur, click or rub, no peripheral edema and peripheral pulses strong  MS: no gross musculoskeletal defects noted, no edema  SKIN: no suspicious lesions or rashes    Diagnostic Test Results:  none     ASSESSMENT/PLAN:             1. Screening examination for pulmonary tuberculosis    - TB INTRADERMAL TEST    Reviewed testing and reading of Mantoux placement.  Recheck prn     3/14- patient had RN read ppd placement- positive - no know exposure and no symptoms of cough,weight loss fevers sweats or chills  Xray ordered to clear for her job - r/o active TB  Will need to establish care with PCP and discussed results- further testing if needed or treatment for latent TB         Herlinda Minaya " WILL White  [facility]

## 2018-03-12 NOTE — MR AVS SNAPSHOT
"              After Visit Summary   3/12/2018    Kristi French    MRN: [id number]           Patient Information     Date Of Birth          1995        Visit Information        Provider Department      3/12/2018 1:40 PM Herlinda White PA-C [facility]        Today's Diagnoses     Screening examination for pulmonary tuberculosis    -  1       Follow-ups after your visit        Your next 10 appointments already scheduled     Mar 14, 2018  1:00 PM CDT   Post Partum with PARISH Cortez CNM   [facility]    [address] [id number] [phone]            Mar 14, 2018  2:00 PM CDT   Nurse Only with OX IMRN   [facility]    [address] [id number] [phone]              Who to contact     If you have questions or need follow up information about today's clinic visit or your schedule please contact [facility] directly at [phone].  Normal or non-critical lab and imaging results will be communicated to you by MyChart, letter or phone within 4 business days after the clinic has received the results. If you do not hear from us within 7 days, please contact the clinic through MyChart or phone. If you have a critical or abnormal lab result, we will notify you by phone as soon as possible.  Submit refill requests through [employer] or call your pharmacy and they will forward the refill request to us. Please allow 3 business days for your refill to be completed.          Additional Information About Your Visit        MyChart Information     [employer] lets you send messages to your doctor, view your test results, renew your prescriptions, schedule appointments and more. To sign up, go to [email or web address] . Click on \"Log in\" on the left side of the screen, which will take " "you to the Welcome page. Then click on \"Sign up Now\" on the right side of the page.     You will be asked to enter the access code listed below, as well as some personal information. Please follow the directions to create your username and password.     Your access code is: [email or web address]  Expires: 6/10/2018  2:25 PM     Your access code will  in 90 days. If you need help or a new code, please call your [place] clinic or [phone].        Care EveryWhere ID     This is your Care EveryWhere ID. This could be used by other organizations to access your [place] medical records  [id number]        Your Vitals Were     Pulse Temperature Respirations Height Last Period Pulse Oximetry    104 98  F (36.7  C) (Oral) 16 5' 1\" (1.549 m) 2017 98%    BMI (Body Mass Index)                   23.77 kg/m2            Blood Pressure from Last 3 Encounters:   18 104/64   18 118/70   18 104/76    Weight from Last 3 Encounters:   18 125 lb 12.8 oz (57.1 kg)   18 150 lb (68 kg)   18 148 lb 9.6 oz (67.4 kg)              We Performed the Following     TB INTRADERMAL TEST        Primary Care Provider Office Phone # Fax #    [facility] [phone] [address] [facility] [id number]        Equal Access to Services     INDY RITCHIE : Hadii aad ku hadasho Soomaali, waaxda luqadaha, qaybta kaalmada adeegyada, faye idiin hayyazan villalobos . So [facility] [phone].    ATENCIÓN: Si habla español, tiene a aguilar disposición servicios gratuitos de asistencia lingüística. Llame al [phone].    We comply with applicable federal civil rights laws and [place] laws. We do not discriminate on the basis of race, color, national origin, age, disability, sex, sexual orientation, or gender identity.            Thank you!     Thank you for choosing [facility]  for your care. Our goal is always to provide you with " excellent care. Hearing back from our patients is one way we can continue to improve our services. Please take a few minutes to complete the written survey that you may receive in the mail after your visit with us. Thank you!             Your Updated Medication List - Protect others around you: Learn how to safely use, store and throw away your medicines at www.disposemymeds.org.          This list is accurate as of 3/12/18  2:25 PM.  Always use your most recent med list.                   Brand Name Dispense Instructions for use Diagnosis    ferrous sulfate 325 (65 FE) MG tablet    IRON    60 tablet    Take 1 tablet (325 mg) by mouth 3 times daily (with meals)    Vaginal delivery, Iron deficiency anemia, unspecified iron deficiency anemia type       ibuprofen 800 MG tablet    ADVIL/MOTRIN    30 tablet    Take 1 tablet (800 mg) by mouth every 6 hours as needed for moderate pain    Vaginal delivery       ondansetron 4 MG tablet    ZOFRAN    18 tablet    Take 1 tablet (4 mg) by mouth every 8 hours as needed for nausea    Iron deficiency anemia, unspecified iron deficiency anemia type       prenatal multivitamin plus iron 27-0.8 MG Tabs per tablet      Take 1 tablet by mouth daily

## 2018-03-14 ENCOUNTER — PRENATAL OFFICE VISIT (OUTPATIENT)
Dept: OBGYN | Facility: CLINIC | Age: 23
End: 2018-03-14
Payer: COMMERCIAL

## 2018-03-14 ENCOUNTER — RADIANT APPOINTMENT (OUTPATIENT)
Dept: GENERAL RADIOLOGY | Facility: CLINIC | Age: 23
End: 2018-03-14
Attending: PHYSICIAN ASSISTANT
Payer: COMMERCIAL

## 2018-03-14 ENCOUNTER — ALLIED HEALTH/NURSE VISIT (OUTPATIENT)
Dept: NURSING | Facility: CLINIC | Age: 23
End: 2018-03-14
Payer: COMMERCIAL

## 2018-03-14 VITALS
SYSTOLIC BLOOD PRESSURE: 100 MMHG | BODY MASS INDEX: 22.91 KG/M2 | WEIGHT: 124.5 LBS | HEIGHT: 62 IN | DIASTOLIC BLOOD PRESSURE: 72 MMHG

## 2018-03-14 DIAGNOSIS — Z11.1 SCREENING EXAMINATION FOR PULMONARY TUBERCULOSIS: Primary | ICD-10-CM

## 2018-03-14 DIAGNOSIS — M54.50 ACUTE BILATERAL LOW BACK PAIN WITHOUT SCIATICA: ICD-10-CM

## 2018-03-14 PROBLEM — O09.90 SUPERVISION OF HIGH RISK PREGNANCY, ANTEPARTUM: Status: RESOLVED | Noted: 2018-01-02 | Resolved: 2018-03-14

## 2018-03-14 PROBLEM — D64.9 ANEMIA: Status: RESOLVED | Noted: 2018-01-05 | Resolved: 2018-03-14

## 2018-03-14 LAB
HGB BLD-MCNC: 13.1 G/DL (ref 11.7–15.7)
PPDINDURATION: 10 MM (ref 0–5)
PPDREDNESS: 11 MM

## 2018-03-14 PROCEDURE — 99207 ZZC NO CHARGE NURSE ONLY: CPT

## 2018-03-14 PROCEDURE — 00000218 ZZHCL STATISTIC OBHBG - HEMOGLOBIN: Performed by: ADVANCED PRACTICE MIDWIFE

## 2018-03-14 PROCEDURE — 36415 COLL VENOUS BLD VENIPUNCTURE: CPT | Performed by: ADVANCED PRACTICE MIDWIFE

## 2018-03-14 PROCEDURE — 99207 ZZC POST PARTUM EXAM: CPT | Performed by: ADVANCED PRACTICE MIDWIFE

## 2018-03-14 PROCEDURE — 71046 X-RAY EXAM CHEST 2 VIEWS: CPT | Mod: FY

## 2018-03-14 NOTE — MR AVS SNAPSHOT
"              After Visit Summary   3/14/2018    Kristi French    MRN: [id number]           Patient Information     Date Of Birth          1995        Visit Information        Provider Department      3/14/2018 2:00 PM [facility]        Today's Diagnoses     Screening examination for pulmonary tuberculosis    -  1       Follow-ups after your visit        Your next 10 appointments already scheduled     Mar 16, 2018  2:30 PM CDT   Office Visit with Tara Obrien MD   [facility] ([facility])    [address] [id number] [phone]           Bring a current list of meds and any records pertaining to this visit. For Physicals, please bring immunization records and any forms needing to be filled out. Please arrive 10 minutes early to complete paperwork.              Who to contact     If you have questions or need follow up information about today's clinic visit or your schedule please contact [facility] directly at [phone].  Normal or non-critical lab and imaging results will be communicated to you by MyChart, letter or phone within 4 business days after the clinic has received the results. If you do not hear from us within 7 days, please contact the clinic through Bellstrikehart or phone. If you have a critical or abnormal lab result, we will notify you by phone as soon as possible.  Submit refill requests through [employer] or call your pharmacy and they will forward the refill request to us. Please allow 3 business days for your refill to be completed.          Additional Information About Your Visit        MyChart Information     [employer] lets you send messages to your doctor, view your test results, renew your prescriptions, schedule appointments and more. To sign up, go to [email or web address] . Click on \"Log in\" on the left side of the screen, which will take you to the " "Welcome page. Then click on \"Sign up Now\" on the right side of the page.     You will be asked to enter the access code listed below, as well as some personal information. Please follow the directions to create your username and password.     Your access code is: [email or web address]  Expires: 6/10/2018  2:25 PM     Your access code will  in 90 days. If you need help or a new code, please call your [place] clinic or [phone].        Care EveryWhere ID     This is your Care EveryWhere ID. This could be used by other organizations to access your [place] medical records  [id number]        Your Vitals Were     Last Period                   2017            Blood Pressure from Last 3 Encounters:   18 100/72   18 104/64   18 118/70    Weight from Last 3 Encounters:   18 124 lb 8 oz (56.5 kg)   18 125 lb 12.8 oz (57.1 kg)   18 150 lb (68 kg)              Today, you had the following     No orders found for display       Primary Care Provider Office Phone # Fax #    [facility] [phone] [address] [facility] [address]        Equal Access to Services     INDY RITCHIE : Hadii aad ku hadasho Soomaali, waaxda luqadaha, qaybta kaalmada adeegyada, waxay idiin hayaan andie villalobos . So [facility] [phone].    ATENCIÓN: Si habla español, tiene a aguilar disposición servicios gratuitos de asistencia lingüística. Llame al [phone].    We comply with applicable federal civil rights laws and [place] laws. We do not discriminate on the basis of race, color, national origin, age, disability, sex, sexual orientation, or gender identity.            Thank you!     Thank you for choosing [facility]  for your care. Our goal is always to provide you with excellent care. Hearing back from our patients is one way we can continue to improve our services. Please take a few minutes to complete the written " survey that you may receive in the mail after your visit with us. Thank you!             Your Updated Medication List - Protect others around you: Learn how to safely use, store and throw away your medicines at www.disposemymeds.org.          This list is accurate as of 3/14/18  2:24 PM.  Always use your most recent med list.                   Brand Name Dispense Instructions for use Diagnosis    ferrous sulfate 325 (65 FE) MG tablet    IRON    60 tablet    Take 1 tablet (325 mg) by mouth 3 times daily (with meals)    Vaginal delivery, Iron deficiency anemia, unspecified iron deficiency anemia type       ibuprofen 800 MG tablet    ADVIL/MOTRIN    30 tablet    Take 1 tablet (800 mg) by mouth every 6 hours as needed for moderate pain    Vaginal delivery       ondansetron 4 MG tablet    ZOFRAN    18 tablet    Take 1 tablet (4 mg) by mouth every 8 hours as needed for nausea    Iron deficiency anemia, unspecified iron deficiency anemia type       prenatal multivitamin plus iron 27-0.8 MG Tabs per tablet      Take 1 tablet by mouth daily

## 2018-03-14 NOTE — NURSING NOTE
Positive PPD.  No history of positive PPD.  Discussed with WILL, CXR ordered.  Advised patient to establish care with a PCP.  Assisted patient in scheduling appointment for Friday with Dr. Obrien.

## 2018-03-14 NOTE — MR AVS SNAPSHOT
"              After Visit Summary   3/14/2018    Kristi French    MRN: [id number]           Patient Information     Date Of Birth          1995        Visit Information        Provider Department      3/14/2018 1:00 PM [facility]        Today's Diagnoses     Routine postpartum follow-up    -  1    Acute bilateral low back pain without sciatica           Follow-ups after your visit        Additional Services     PHYSICAL THERAPY REFERRAL       *This therapy referral will be filtered to a centralized scheduling office at [facility] and the patient will receive a call to schedule an appointment at a [place] location most convenient for them. *     [facility] provides Physical Therapy evaluation and treatment and many specialty services across the [place] system.  If requesting a specialty program, please choose from the list below.    If you have not heard from the scheduling office within 2 business days, please call [phone] for all locations, with the exception of [place], please call [phone] and [facility], please call [phone]  Treatment: Evaluation & Treatment  Special Instructions/Modalities:   Special Programs: None    Please be aware that coverage of these services is subject to the terms and limitations of your health insurance plan.  Call member services at your health plan with any benefit or coverage questions.      **Note to Provider:  If you are referring outside of [place] for the therapy appointment, please list the name of the location in the \"special instructions\" above, print the referral and give to the patient to schedule the appointment.                  Follow-up notes from your care team     Return in about 4 weeks (around 4/11/2018), or if symptoms worsen or fail to improve, for IUD .      Your next 10 appointments already scheduled     Mar 16, 2018  2:30 PM CDT "   Office Visit with Tara Obrien MD   [facility]    [address] [id number] [phone]           Bring a current list of meds and any records pertaining to this visit. For Physicals, please bring immunization records and any forms needing to be filled out. Please arrive 10 minutes early to complete paperwork.            Mar 20, 2018 11:40 AM CDT   (Arrive by 11:25 AM)   RENETTA Spine with Abeba Peguero, PT   [facility] ([facility]  )    [address]  [facility] [address]   [phone]            Mar 27, 2018 11:40 AM CDT   RENETTA Spine with Abeba Peguero, PT   [facility] ([facility]  )    [address]  [facility] [id number] [phone]            Apr 03, 2018 11:40 AM CDT   RENETTA Spine with Eliazar Willett, PT   [facility] ([facility]  )    [address]  [facility] [id number] [phone]              Who to contact     If you have questions or need follow up information about today's clinic visit or your schedule please contact [facility] directly at [phone].  Normal or non-critical lab and imaging results will be communicated to you by MyChart, letter or phone within 4 business days after the clinic has received the results. If you do not hear from us within 7 days, please contact the clinic through Lot18hart or phone. If you have a critical or abnormal lab result, we will notify you by phone as soon as possible.  Submit refill requests through [employer] or call your pharmacy and they will forward the refill request to us. Please allow 3 business days for your refill to be completed.          Additional Information About Your Visit        Lot18hart Information     [employer] lets you  "send messages to your doctor, view your test results, renew your prescriptions, schedule appointments and more. To sign up, go to [email or web address] . Click on \"Log in\" on the left side of the screen, which will take you to the Welcome page. Then click on \"Sign up Now\" on the right side of the page.     You will be asked to enter the access code listed below, as well as some personal information. Please follow the directions to create your username and password.     Your access code is: [email or web address]  Expires: 6/10/2018  2:25 PM     Your access code will  in 90 days. If you need help or a new code, please call your [place] clinic or [phone].        Care EveryWhere ID     This is your [facility] EveryWhere ID. This could be used by other organizations to access your [place] medical records  [id number]        Your Vitals Were     Height Last Period BMI (Body Mass Index)             5' 2\" (1.575 m) 2017 22.77 kg/m2          Blood Pressure from Last 3 Encounters:   18 100/72   18 104/64   18 118/70    Weight from Last 3 Encounters:   18 124 lb 8 oz (56.5 kg)   18 125 lb 12.8 oz (57.1 kg)   18 150 lb (68 kg)              We Performed the Following     OB hemoglobin     PHYSICAL THERAPY REFERRAL        Primary Care Provider Office Phone # Fax #    [facility] [phone] [address] [facility] [address]        Equal Access to Services     INDY RITCHIE AH: Hadii ruth Gonzalez, wabinuda luqadaha, qaybta kaalfaye acosta. So [facility] [phone].    ATENCIÓN: Si habla español, tiene a aguilar disposición servicios gratuitos de asistencia lingüística. Aristeo schaffer [phone].    We comply with applicable federal civil rights laws and [place] laws. We do not discriminate on the basis of race, color, national origin, age, disability, sex, sexual orientation, or gender " identity.            Thank you!     Thank you for choosing [facility]  for your care. Our goal is always to provide you with excellent care. Hearing back from our patients is one way we can continue to improve our services. Please take a few minutes to complete the written survey that you may receive in the mail after your visit with us. Thank you!             Your Updated Medication List - Protect others around you: Learn how to safely use, store and throw away your medicines at www.disposemymeds.org.          This list is accurate as of 3/14/18 11:59 PM.  Always use your most recent med list.                   Brand Name Dispense Instructions for use Diagnosis    ferrous sulfate 325 (65 FE) MG tablet    IRON    60 tablet    Take 1 tablet (325 mg) by mouth 3 times daily (with meals)    Vaginal delivery, Iron deficiency anemia, unspecified iron deficiency anemia type       ibuprofen 800 MG tablet    ADVIL/MOTRIN    30 tablet    Take 1 tablet (800 mg) by mouth every 6 hours as needed for moderate pain    Vaginal delivery       ondansetron 4 MG tablet    ZOFRAN    18 tablet    Take 1 tablet (4 mg) by mouth every 8 hours as needed for nausea    Iron deficiency anemia, unspecified iron deficiency anemia type       prenatal multivitamin plus iron 27-0.8 MG Tabs per tablet      Take 1 tablet by mouth daily

## 2018-03-14 NOTE — PROGRESS NOTES
Midwife Postpartum 6 Week Visit    Kristi French is a 22 year old here for a postpartum checkup.     Delivery date was 18. She had a  of a viable boy, weight 7  Pounds 8oz., with no complications      Since delivery, she has been breast feeding.  She has not had any signs of infection, her lochia stopped after 1 weeks.  She has not had other complications.      She is voiding and having bowel movements without difficulty.       Contraception was discussed and patient desires unsure.   She  has not had intercourse since delivery.   She complains of No  perineal discomfort.     Mood is Stable  Patient screened for postpartum depression.   Depression Rating was:   Last PHQ-9 score on record =   PHQ-9 SCORE 3/14/2018   Total Score -   Total Score 1

## 2018-03-14 NOTE — LETTER
Kristi French  [address]  [facility] [address]          To whom it may concern,   Kristi French was seen today March 14, 2018 for routine postpartum visit.  Her exam was normal today. Kristi French delivered on 2/17/2018, her recovery has been uncomplicated.  She is able to return to work without restrictions.  If you have questions or concerns please contact our office at the number listed above. Thank you for your consideration.          Sincerely,       PARISH Cole, JOSH

## 2018-03-14 NOTE — NURSING NOTE
"Chief Complaint   Patient presents with     Postpartum Care     Vaginal delivery on 02/07/18     Back Pain       Initial /72 (BP Location: Left arm, Patient Position: Chair, Cuff Size: Adult Regular)  Ht 5' 2\" (1.575 m)  Wt 124 lb 8 oz (56.5 kg)  LMP 05/02/2017  BMI 22.77 kg/m2 Estimated body mass index is 22.77 kg/(m^2) as calculated from the following:    Height as of this encounter: 5' 2\" (1.575 m).    Weight as of this encounter: 124 lb 8 oz (56.5 kg).  Medication Reconciliation: complete      "

## 2018-03-15 ASSESSMENT — PATIENT HEALTH QUESTIONNAIRE - PHQ9: SUM OF ALL RESPONSES TO PHQ QUESTIONS 1-9: 1

## 2018-03-15 NOTE — PROGRESS NOTES
"SUBJECTIVE:   Kristi French is here for her 6-week postpartum checkup.   Had lochia bleeding for 7-8 days.  Started bleeding this morning, patient state she feels it is return of menses.     DELIVERY DATE: 18    of a viable boy, weight 7 pounds 8 oz., with none complications.  FEEDING METHOD:Breastfeding exclusively every 3 hours    CONTRACEPTION PLANNED: unsure  She  has not had intercourse since delivery and complains of mild discomfort in low/mid back, reports area near epidural site sore. Pt unsure if r/t enlarged breasts causing back pain.  Wearing supportive bra, has had back pain like this in past. Has tired heat, ice, ibuprofen with little relief.    HX OF DEPRESSION:No, denies symptoms of postpartum depression, denies feeling concerned about depression   HX OF ABUSE:No  OTHER HPI: She has no signs of infection, bleeding or other complications. Bleeding stopped, epis/lac healing well.         EXAM:  /72 (BP Location: Left arm, Patient Position: Chair, Cuff Size: Adult Regular)  Ht 5' 2\" (1.575 m)  Wt 124 lb 8 oz (56.5 kg)  LMP 2017  BMI 22.77 kg/m2  GENERAL APPEARANCE: healthy, alert and no distress  NECK: thyroid normal to palpation  BREAST: soft, nontender, nipples intact, lactating   ABDOMEN: soft, nontender, diastasis recti closing  PSYCH: mentation appears normal and affect normal/bright  PELVIC EXAM:   Vagina: Discharge normal and physiologic  Uterus: Normal size and position, non-tender, mobile  Rectal exam: deferred    ASSESSMENT:   Normal postpartum exam after .    PLAN:  Birth Control as ordered. Fertility reviewed.  Discussed LARCs, patient interested in Mirena IUD. Handout provided.  Patient to make appointment for placement. Reviewed fertility.   Return as needed or at time interval for next routine pap, pelvic, or breast exam.  Encourage Kegals and abdominal exercise. Slow, steady weight loss.  Continue a multi vitamin supplement, especially if " breastfeeding.  Pap smear was not obtained.  GC/CHLAMYDIA CULTURE OBTAINED:NO   Post partum Hgb was obtained.    (M54.5) Acute bilateral low back pain without sciatica  (primary encounter diagnosis)  Comment: referral made   Plan: PHYSICAL THERAPY REFERRAL        Follow up for back pain postpartum     (Z39.2) Routine postpartum follow-up  Comment: collected  Plan: OB hemoglobin        Results pending   note provided for okay to return to work         PARISH Cole, JOSH

## 2018-03-20 ENCOUNTER — THERAPY VISIT (OUTPATIENT)
Dept: PHYSICAL THERAPY | Facility: CLINIC | Age: 23
End: 2018-03-20
Payer: COMMERCIAL

## 2018-03-20 DIAGNOSIS — G89.29 CHRONIC BILATERAL LOW BACK PAIN WITHOUT SCIATICA: Primary | ICD-10-CM

## 2018-03-20 DIAGNOSIS — M54.50 CHRONIC BILATERAL LOW BACK PAIN WITHOUT SCIATICA: Primary | ICD-10-CM

## 2018-03-20 PROCEDURE — 97112 NEUROMUSCULAR REEDUCATION: CPT | Mod: GP | Performed by: PHYSICAL THERAPIST

## 2018-03-20 PROCEDURE — 97110 THERAPEUTIC EXERCISES: CPT | Mod: GP | Performed by: PHYSICAL THERAPIST

## 2018-03-20 PROCEDURE — 97161 PT EVAL LOW COMPLEX 20 MIN: CPT | Mod: GP | Performed by: PHYSICAL THERAPIST

## 2018-03-20 NOTE — MR AVS SNAPSHOT
After Visit Summary   3/20/2018    Kristi French    MRN: [id number]           Patient Information     Date Of Birth          1995        Visit Information        Provider Department      3/20/2018 11:40 AM Abeba Peguero, PT [facility]        Today's Diagnoses     Chronic bilateral low back pain without sciatica    -  1       Follow-ups after your visit        Your next 10 appointments already scheduled     Mar 27, 2018 11:40 AM CDT   RENETTA Spine with Abeba Peguero PT   [facility] ([facility]  )    [address]  [facility] [address]   [phone]            Apr 03, 2018 11:40 AM CDT   RENETTA Spine with Eliazar Willett PT   [facility] ([facility]  )    [address]  [facility] [id number] [phone]              Who to contact     If you have questions or need follow up information about today's clinic visit or your schedule please contact [facility] PHYSICAL THERAPY directly at [phone].  Normal or non-critical lab and imaging results will be communicated to you by MyChart, letter or phone within 4 business days after the clinic has received the results. If you do not hear from us within 7 days, please contact the clinic through AGELON ?hart or phone. If you have a critical or abnormal lab result, we will notify you by phone as soon as possible.  Submit refill requests through Phenomix or call your pharmacy and they will forward the refill request to us. Please allow 3 business days for your refill to be completed.          Additional Information About Your Visit        MyChart Information     P[employer]om[employer] lets you send messages to your doctor, view your test results, renew your prescriptions, schedule appointments and more. To sign up, go to [email or web address] .  "Click on \"Log in\" on the left side of the screen, which will take you to the Welcome page. Then click on \"Sign up Now\" on the right side of the page.     You will be asked to enter the access code listed below, as well as some personal information. Please follow the directions to create your username and password.     Your access code is: [email or web address]  Expires: 6/10/2018  2:25 PM     Your access code will  in 90 days. If you need help or a new code, please call your [place] clinic or [phone].        Care EveryWhere ID     This is your Care EveryWhere ID. This could be used by other organizations to access your [place] medical records  [id number]         Blood Pressure from Last 3 Encounters:   18 100/72   18 104/64   18 118/70    Weight from Last 3 Encounters:   18 56.5 kg (124 lb 8 oz)   18 57.1 kg (125 lb 12.8 oz)   18 68 kg (150 lb)              We Performed the Following     RENETTA Inital Eval Report     Neuromuscular Re-Education     PT Eval, Low Complexity (88591)     Therapeutic Exercises        [facility] Care Provider Office Phone # Fax #    [facility] [phone] [address] [facility] [address]        Equal Access to Services     INDY RITCHIE : Hadii ruth ku hadasho Somarianali, waaxda luqadaha, qaybta kaalmada adedarvinyada, faye duffy. So [facility] [phone].    ATENCIÓN: Si habla español, tiene a aguilar disposición servicios gratuitos de asistencia lingüística. Aristeo schaffer [phone].    We comply with applicable federal civil rights laws and [place] laws. We do not discriminate on the basis of race, color, national origin, age, disability, sex, sexual orientation, or gender identity.            Thank you!     Thank you for choosing [facility] PHYSICAL THERAP[facility]  for your care. Our goal is always to provide you with excellent care. Hearing back from " our patients is one way we can continue to improve our services. Please take a few minutes to complete the written survey that you may receive in the mail after your visit with us. Thank you!             Your Updated Medication List - Protect others around you: Learn how to safely use, store and throw away your medicines at www.disposemymeds.org.          This list is accurate as of 3/20/18  7:31 PM.  Always use your most recent med list.                   Brand Name Dispense Instructions for use Diagnosis    ferrous sulfate 325 (65 FE) MG tablet    IRON    60 tablet    Take 1 tablet (325 mg) by mouth 3 times daily (with meals)    Vaginal delivery, Iron deficiency anemia, unspecified iron deficiency anemia type       ibuprofen 800 MG tablet    ADVIL/MOTRIN    30 tablet    Take 1 tablet (800 mg) by mouth every 6 hours as needed for moderate pain    Vaginal delivery       ondansetron 4 MG tablet    ZOFRAN    18 tablet    Take 1 tablet (4 mg) by mouth every 8 hours as needed for nausea    Iron deficiency anemia, unspecified iron deficiency anemia type       prenatal multivitamin plus iron 27-0.8 MG Tabs per tablet      Take 1 tablet by mouth daily

## 2018-03-20 NOTE — PROGRESS NOTES
[facility] Initial Evaluation -- Lumbar    Date: March 20, 2018  Kristi French is a 22 year old female with a lumbar condition.   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  Delivered her baby on 02/07/2018, and pain as persisted     Symptoms (improving/unchanging/worsening):  unchanging.     Symptoms commenced as a result of: during pregnancy   Condition occurred in the following environment:   unknown     Symptoms at onset (back/thigh/leg): B LB  Constant symptoms (back/thigh/leg): none  Intermittent symptoms (back/thigh/leg): B LB    Symptoms are made worse with the following: Always Bending, Always leaning, Always Sitting 30 minutes, Always When still and Other - Lifting; sometimes worse in the morning and sometimes evening   Symptoms are made better with the following: Always On the move, always wearing lumbar belt, always walking, sometimes standing, sometimes ice    Disturbed sleep (yes/no):  no Sleeping postures (prone/sup/side R/L): sides    Previous episodes (0/1-5/6-10/11+): 0 Year of first episode: na    Previous history: na  Previous treatments: na      Specific Questions:  Cough/Sneeze/Strain (pos/neg): neg  Bowel/Bladder (normal/abnormal): normal  Gait (normal/abnormal): normal  Medications (nil/NSAIDS/analg/steroids/anticoag/other):  None  Medical allergies:  none  General health (excellent/good/fair/poor):  good  Pertinent  medical history:  None  Imaging (None/Xray/MRI/Other):  none  Recent or major surgery (yes/no):  no  Night pain (yes/no): no  Accidents (yes/no): no  Unexplained weight loss (yes/no): no  Barriers at home: no  Other red flags: no    EXAMINATION    Posture:   Sitting (good/fair/poor): poor  Standing (good/fair/poor):fair  Lordosis (red/acc/normal): normal  Correction of posture (better/worse/no effect): better    Lateral Shift (right/left/nil): nil  Relevant (yes/no):  na  Other Observations: na    Neurological:    Motor deficit:  Not assessed--no radicular symptoms  Reflexes: Not assessed--no radicular symptoms  Sensory deficit:  Not assessed--no radicular symptoms  Dural signs:  Not assessed--no radicular symptoms    Movement Loss:   Paul Mod Min Nil Pain   Flexion    x P. B upper lumbar pain   Extension  x   P. B LBP   Side Gliding R    x P. R LBP   Side Gliding L    x P. L LBP     Test Movements:   During: produces, abolishes, increases, decreases, no effect, centralizing, peripheralizing   After: better, worse, no better, no worse, no effect, centralized, peripheralized    Pretest symptoms standing:    Symptoms During Symptoms After ROM increased ROM decreased No Effect   FIS        Rep FIS        EIS        Rep EIS        Pretest symptoms lyin/10    Symptoms During Symptoms After ROM increased ROM decreased No Effect   ROSE MARIE        Rep ROSE MARIE        EIL NE No Effect   x   Rep EIL No Effect Better-abolished pain with extension  x     If required, pretest symptoms:    Symptoms During Symptoms After ROM increased ROM decreased No Effect   SGIS - R        Rep SGIS - R        SGIS - L        Rep SGIS - L          Static Tests:  Sitting slouched:    Sitting erect:    Standing slouched   Standing erect:    Lying prone in extension:   Long sitting:      Other Tests:     Provisional Classification:  Derangement - Bilateral, symmetrical, symptoms above knee    Principle of Management:  Education:  Posture--use of lumbar  roll in sitting, avoidance of slouched postures   Equipment provided:  none  Mechanical therapy (Y/N):  y   Extension principle:  REIL x10 reps, every 2 hours  Lateral Principle:    Flexion principle:    Other:      ASSESSMENT/PLAN:    Patient is a 22 year old female with lumbar complaints.  Provisional classification of derangement with directional preference for extension.  She had decreased pain with standing extension after repeated lumbar extension in lying and increased extension ROM.  She will try at home to assess further.  Treatment will focus on lumbar extension exercises in addition to posture and body mechanics training to decrease pain and improve overall mobility and function.     Patient has the following significant findings with corresponding treatment plan.                Diagnosis 1:  LBP  Pain -  self management, education, directional preference exercise and home program  Decreased ROM/flexibility - manual therapy, therapeutic exercise and home program  Decreased function - therapeutic activities and home program  Impaired posture - neuro re-education and home program    Therapy Evaluation Codes:   1) History comprised of:   Personal factors that impact the plan of care:      None.    Comorbidity factors that impact the plan of care are:      None.     Medications impacting care: None.  2) Examination of Body Systems comprised of:   Body structures and functions that impact the plan of care:      Lumbar spine.   Activity limitations that impact the plan of care are:      Bending, Sitting and rising from sitting.  3) Clinical presentation characteristics are:   Stable/Uncomplicated.  4) Decision-Making    Low complexity using standardized patient assessment instrument and/or measureable assessment of functional outcome.  Cumulative Therapy Evaluation is: Low complexity.    Previous and current functional limitations:  (See Goal Flow Sheet for this information)    Short term and Long term goals:  (See Goal Flow Sheet for this information)     Communication ability:  Patient appears to be able to clearly communicate and understand verbal and written communication and follow directions correctly.  Treatment Explanation - The following has been discussed with the patient:   RX ordered/plan of care  Anticipated outcomes  Possible risks and side effects  This patient would benefit from PT intervention to resume normal activities.   Rehab potential is good.    Frequency:  1 X week, once daily  Duration:  for 4 weeks tapering to 2 X a month over 1 month  Discharge Plan:  Achieve all LTG.  Independent in home treatment program.  Reach maximal therapeutic benefit.    Please refer to the daily flowsheet for treatment today, total treatment time and time spent performing 1:1 timed codes.

## 2018-03-27 ENCOUNTER — THERAPY VISIT (OUTPATIENT)
Dept: PHYSICAL THERAPY | Facility: CLINIC | Age: 23
End: 2018-03-27
Payer: COMMERCIAL

## 2018-03-27 DIAGNOSIS — M54.50 CHRONIC BILATERAL LOW BACK PAIN WITHOUT SCIATICA: ICD-10-CM

## 2018-03-27 DIAGNOSIS — G89.29 CHRONIC BILATERAL LOW BACK PAIN WITHOUT SCIATICA: ICD-10-CM

## 2018-03-27 PROCEDURE — 97110 THERAPEUTIC EXERCISES: CPT | Mod: GP | Performed by: PHYSICAL THERAPIST

## 2018-03-27 PROCEDURE — 97530 THERAPEUTIC ACTIVITIES: CPT | Mod: GP | Performed by: PHYSICAL THERAPIST

## 2018-03-27 NOTE — MR AVS SNAPSHOT
"              After Visit Summary   3/27/2018    Kristi French    MRN: [id number]           Patient Information     Date Of Birth          1995        Visit Information        Provider Department      3/27/2018 11:40 AM Abeba Peguero PT [facility]        Today's Diagnoses     Chronic bilateral low back pain without sciatica           Follow-ups after your visit        Your next 10 appointments already scheduled     Apr 04, 2018  5:20 PM CDT   [facility] with Abeba Peguero PT   [facility] ([facility]  )    [address] [id number] [phone]              Who to contact     If you have questions or need follow up information about today's clinic visit or your schedule please contact [facility] directly at [phone].  Normal or non-critical lab and imaging results will be communicated to you by [employer], letter or phone within 4 business days after the clinic has received the results. If you do not hear from us within 7 days, please contact the clinic through [employer] or phone. If you have a critical or abnormal lab result, we will notify you by phone as soon as possible.  Submit refill requests through [employer] or call your pharmacy and they will forward the refill request to us. Please allow 3 business days for your refill to be completed.          Additional Information About Your Visit        MyChart Information     [employer] lets you send messages to your doctor, view your test results, renew your prescriptions, schedule appointments and more. To sign up, go to [email or web address] . Click on \"Log in\" on the left side of the screen, which will take you to the Welcome page. Then click on \"Sign up Now\" on the right side of the page.     You will be asked to enter the access code listed below, as well as some personal " information. Please follow the directions to create your username and password.     Your access code is: [email or web address]  Expires: 6/10/2018  2:25 PM     Your access code will  in 90 days. If you need help or a new code, please call your [place] clinic or [phone].        Care EveryWhere ID     This is your Care EveryWhere ID. This could be used by other organizations to access your [place] medical records  [id number]         Blood Pressure from Last 3 Encounters:   18 100/72   18 104/64   18 118/70    Weight from Last 3 Encounters:   18 56.5 kg (124 lb 8 oz)   18 57.1 kg (125 lb 12.8 oz)   18 68 kg (150 lb)              We Performed the Following     Therapeutic Activities     Therapeutic Exercises        Primary Care Provider Office Phone # Fax #    [facility] [phone] [address] [facility] [address]        Equal Access to Services     INDY RITCHIE : Hadii aad ku hadasho Soomaali, waaxda luqadaha, qaybta kaalmada adeegyada, waxay emma villalobos . So [facility] [phone].    ATENCIÓN: Si habla español, tiene a aguilar disposición servicios gratuitos de asistencia lingüística. Aristeo al [phone].    We comply with applicable federal civil rights laws and [place] laws. We do not discriminate on the basis of race, color, national origin, age, disability, sex, sexual orientation, or gender identity.            Thank you!     Thank you for choosing [facility]  for your care. Our goal is always to provide you with excellent care. Hearing back from our patients is one way we can continue to improve our services. Please take a few minutes to complete the written survey that you may receive in the mail after your visit with us. Thank you!             Your Updated Medication List - Protect others around you: Learn how to safely use, store and throw  away your medicines at www.disposemymeds.org.          This list is accurate as of 3/27/18 12:56 PM.  Always use your most recent med list.                   Brand Name Dispense Instructions for use Diagnosis    ferrous sulfate 325 (65 FE) MG tablet    IRON    60 tablet    Take 1 tablet (325 mg) by mouth 3 times daily (with meals)    Vaginal delivery, Iron deficiency anemia, unspecified iron deficiency anemia type       ibuprofen 800 MG tablet    ADVIL/MOTRIN    30 tablet    Take 1 tablet (800 mg) by mouth every 6 hours as needed for moderate pain    Vaginal delivery       ondansetron 4 MG tablet    ZOFRAN    18 tablet    Take 1 tablet (4 mg) by mouth every 8 hours as needed for nausea    Iron deficiency anemia, unspecified iron deficiency anemia type       prenatal multivitamin plus iron 27-0.8 MG Tabs per tablet      Take 1 tablet by mouth daily

## 2018-03-29 ENCOUNTER — TELEPHONE (OUTPATIENT)
Dept: INTERNAL MEDICINE | Facility: CLINIC | Age: 23
End: 2018-03-29

## 2018-03-29 NOTE — TELEPHONE ENCOUNTER
Reason for Call:  Request for results:    Name of test or procedure: Mantoux and Chest Xray    Date of test of procedure: 3/14/18    Location of the test or procedure: [facility]    OK to leave the result message on voice mail or with a family member? YES    Phone number Patient can be reached at:  Home number on file [phone] (home)    Additional comments: patient would like the results faxed over to her employer. ( [employer] ) Fax (Anthony, [phone])    Call taken on 3/29/2018 at 11:05 AM by SUNG CHAVEZ

## 2018-10-02 ENCOUNTER — HEALTH MAINTENANCE LETTER (OUTPATIENT)
Age: 23
End: 2018-10-02

## 2019-01-27 PROBLEM — R87.610 PAPANICOLAOU SMEAR OF CERVIX WITH ATYPICAL SQUAMOUS CELLS OF UNDETERMINED SIGNIFICANCE (ASC-US): Status: ACTIVE | Noted: 2017-08-31

## 2019-02-07 ENCOUNTER — OFFICE VISIT (OUTPATIENT)
Dept: URGENT CARE | Facility: URGENT CARE | Age: 24
End: 2019-02-07
Payer: COMMERCIAL

## 2019-02-07 VITALS
HEART RATE: 93 BPM | TEMPERATURE: 98.7 F | SYSTOLIC BLOOD PRESSURE: 104 MMHG | WEIGHT: 130.8 LBS | BODY MASS INDEX: 23.92 KG/M2 | DIASTOLIC BLOOD PRESSURE: 69 MMHG | OXYGEN SATURATION: 98 %

## 2019-02-07 DIAGNOSIS — R10.32 BILATERAL LOWER ABDOMINAL PAIN: ICD-10-CM

## 2019-02-07 DIAGNOSIS — R10.84 ABDOMINAL PAIN, GENERALIZED: Primary | ICD-10-CM

## 2019-02-07 DIAGNOSIS — R10.31 BILATERAL LOWER ABDOMINAL PAIN: ICD-10-CM

## 2019-02-07 DIAGNOSIS — R10.2 PELVIC PAIN IN FEMALE: ICD-10-CM

## 2019-02-07 LAB
ALBUMIN UR-MCNC: NEGATIVE MG/DL
APPEARANCE UR: CLEAR
BASOPHILS # BLD AUTO: 0 10E9/L (ref 0–0.2)
BASOPHILS NFR BLD AUTO: 0.1 %
BILIRUB UR QL STRIP: NEGATIVE
COLOR UR AUTO: YELLOW
DIFFERENTIAL METHOD BLD: ABNORMAL
EOSINOPHIL # BLD AUTO: 0.2 10E9/L (ref 0–0.7)
EOSINOPHIL NFR BLD AUTO: 2.1 %
ERYTHROCYTE [DISTWIDTH] IN BLOOD BY AUTOMATED COUNT: 12.8 % (ref 10–15)
GLUCOSE UR STRIP-MCNC: NEGATIVE MG/DL
HCG UR QL: POSITIVE
HCT VFR BLD AUTO: 34.3 % (ref 35–47)
HGB BLD-MCNC: 11.8 G/DL (ref 11.7–15.7)
HGB UR QL STRIP: ABNORMAL
KETONES UR STRIP-MCNC: NEGATIVE MG/DL
LEUKOCYTE ESTERASE UR QL STRIP: ABNORMAL
LYMPHOCYTES # BLD AUTO: 3.1 10E9/L (ref 0.8–5.3)
LYMPHOCYTES NFR BLD AUTO: 43 %
MCH RBC QN AUTO: 29.4 PG (ref 26.5–33)
MCHC RBC AUTO-ENTMCNC: 34.4 G/DL (ref 31.5–36.5)
MCV RBC AUTO: 86 FL (ref 78–100)
MONOCYTES # BLD AUTO: 0.7 10E9/L (ref 0–1.3)
MONOCYTES NFR BLD AUTO: 9.5 %
NEUTROPHILS # BLD AUTO: 3.3 10E9/L (ref 1.6–8.3)
NEUTROPHILS NFR BLD AUTO: 45.3 %
NITRATE UR QL: NEGATIVE
NON-SQ EPI CELLS #/AREA URNS LPF: ABNORMAL /LPF
PH UR STRIP: 6 PH (ref 5–7)
PLATELET # BLD AUTO: 348 10E9/L (ref 150–450)
RBC # BLD AUTO: 4.01 10E12/L (ref 3.8–5.2)
RBC #/AREA URNS AUTO: ABNORMAL /HPF
SOURCE: ABNORMAL
SP GR UR STRIP: 1.01 (ref 1–1.03)
UROBILINOGEN UR STRIP-ACNC: 0.2 EU/DL (ref 0.2–1)
WBC # BLD AUTO: 7.3 10E9/L (ref 4–11)
WBC #/AREA URNS AUTO: ABNORMAL /HPF

## 2019-02-07 PROCEDURE — 81025 URINE PREGNANCY TEST: CPT | Performed by: PHYSICIAN ASSISTANT

## 2019-02-07 PROCEDURE — 36415 COLL VENOUS BLD VENIPUNCTURE: CPT | Performed by: PHYSICIAN ASSISTANT

## 2019-02-07 PROCEDURE — 87491 CHLMYD TRACH DNA AMP PROBE: CPT | Performed by: PHYSICIAN ASSISTANT

## 2019-02-07 PROCEDURE — 81001 URINALYSIS AUTO W/SCOPE: CPT | Performed by: PHYSICIAN ASSISTANT

## 2019-02-07 PROCEDURE — 87591 N.GONORRHOEAE DNA AMP PROB: CPT | Performed by: PHYSICIAN ASSISTANT

## 2019-02-07 PROCEDURE — 99215 OFFICE O/P EST HI 40 MIN: CPT | Performed by: PHYSICIAN ASSISTANT

## 2019-02-07 PROCEDURE — 85025 COMPLETE CBC W/AUTO DIFF WBC: CPT | Performed by: PHYSICIAN ASSISTANT

## 2019-02-07 RX ORDER — NITROFURANTOIN 25; 75 MG/1; MG/1
100 CAPSULE ORAL 2 TIMES DAILY
Qty: 14 CAPSULE | Refills: 0 | Status: SHIPPED | OUTPATIENT
Start: 2019-02-07 | End: 2024-04-22

## 2019-02-07 NOTE — PROGRESS NOTES
SUBJECTIVE  HPI:  Kristi French is a 23 year old female who presents with the CC of abdominal/pelvic pain.   Pelvic area. Pain is located in the suprapubic area, with radiation to back.  The pain is characterized as dull, aching, pressure and cramping, and at worst is a level 5 on a scale of 1-10.  Pain has been present for few days  and is slowly progressive.  EXACERBATING FACTORS: urinating  RELIEVING FACTORS: nothing.  ASSOCIATED SX: nausea and fatigue.    Past Medical History:   Diagnosis Date     Anemia      No active medical problems      Papanicolaou smear of cervix with atypical squamous cells of undetermined significance (ASC-US) 8/31/2017 8/31/17 ASCUS in 22 yr old.  Plan: repeat pap only in 1 year     ALLERGIES  No Known Allergies     Family History   Problem Relation Age of Onset     Cancer Paternal Grandfather         liver     Eye Disorder Paternal Grandmother         Blind       Social History     Tobacco Use     Smoking status: Never Smoker     Smokeless tobacco: Never Used   Substance Use Topics     Alcohol use: No       ROS:  CONSTITUTIONAL:NEGATIVE for fever, chills, change in weight  INTEGUMENTARY/SKIN: NEGATIVE for worrisome rashes, moles or lesions  ENT/MOUTH: NEGATIVE for ear, mouth and throat problems  RESP:NEGATIVE for significant cough or SOB  CV: NEGATIVE for chest pain, palpitations or peripheral edema  GI: POSITIVE for nausea and lower abdominal pain, tenderness, pain with pressure  : Positive for dysuria  VASC: NEGATIVE for cool extremities  MUSCULOSKELETAL: NEGATIVE for significant arthralgias or myalgia  NEURO: Positive for fatigue    OBJECTIVE:  /69   Pulse 93   Temp 98.7  F (37.1  C) (Tympanic)   Wt 59.3 kg (130 lb 12.8 oz)   SpO2 98%   BMI 23.92 kg/m    GENERAL APPEARANCE: healthy, alert and no distress  EYES: EOMI,  PERRL, conjunctiva clear  HENT: ear canals and TM's normal.  Nose and mouth without ulcers, erythema or lesions  NECK: supple, nontender, no  lymphadenopathy  RESP: lungs clear to auscultation - no rales, rhonchi or wheezes  CV: regular rates and rhythm, normal S1 S2, no murmur noted  ABDOMEN: tenderness moderate suprapubic  GU_female: deferred  Extremities: no peripheral edema or tenderness, peripheral pulses normal  MS: extremities normal- no gross deformities noted, no erythema, FROM noted in all extremities  NEURO: Normal strength and tone, sensory exam grossly normal,  normal speech and mentation  SKIN: no suspicious lesions or rashes    Results for orders placed or performed in visit on 02/07/19   HCG qualitative urine   Result Value Ref Range    HCG Qual Urine Positive (A) NEG^Negative   UA with Microscopic reflex to Culture   Result Value Ref Range    Color Urine Yellow     Appearance Urine Clear     Glucose Urine Negative NEG^Negative mg/dL    Bilirubin Urine Negative NEG^Negative    Ketones Urine Negative NEG^Negative mg/dL    Specific Gravity Urine 1.015 1.003 - 1.035    pH Urine 6.0 5.0 - 7.0 pH    Protein Albumin Urine Negative NEG^Negative mg/dL    Urobilinogen Urine 0.2 0.2 - 1.0 EU/dL    Nitrite Urine Negative NEG^Negative    Blood Urine Trace (A) NEG^Negative    Leukocyte Esterase Urine Small (A) NEG^Negative    Source Midstream Urine     WBC Urine 5-10 (A) OTO5^0 - 5 /HPF    RBC Urine 5-10 (A) OTO2^O - 2 /HPF    Squamous Epithelial /LPF Urine Moderate (A) FEW^Few /LPF   CBC with platelets differential   Result Value Ref Range    WBC 7.3 4.0 - 11.0 10e9/L    RBC Count 4.01 3.8 - 5.2 10e12/L    Hemoglobin 11.8 11.7 - 15.7 g/dL    Hematocrit 34.3 (L) 35.0 - 47.0 %    MCV 86 78 - 100 fl    MCH 29.4 26.5 - 33.0 pg    MCHC 34.4 31.5 - 36.5 g/dL    RDW 12.8 10.0 - 15.0 %    Platelet Count 348 150 - 450 10e9/L    % Neutrophils 45.3 %    % Lymphocytes 43.0 %    % Monocytes 9.5 %    % Eosinophils 2.1 %    % Basophils 0.1 %    Absolute Neutrophil 3.3 1.6 - 8.3 10e9/L    Absolute Lymphocytes 3.1 0.8 - 5.3 10e9/L    Absolute Monocytes 0.7 0.0 - 1.3  10e9/L    Absolute Eosinophils 0.2 0.0 - 0.7 10e9/L    Absolute Basophils 0.0 0.0 - 0.2 10e9/L    Diff Method Automated Method        ASSESSMENT/PLAN      ICD-10-CM    1. Abdominal pain, generalized R10.84 HCG qualitative urine     UA with Microscopic reflex to Culture     CBC with platelets differential     NEISSERIA GONORRHOEA PCR     CHLAMYDIA TRACHOMATIS PCR     nitroFURantoin macrocrystal-monohydrate (MACROBID) 100 MG capsule     CANCELED: Beta HCG qual IFA urine - [facility] [place]   2. Pelvic pain in female R10.2 HCG qualitative urine     UA with Microscopic reflex to Culture     CBC with platelets differential     NEISSERIA GONORRHOEA PCR     CHLAMYDIA TRACHOMATIS PCR     nitroFURantoin macrocrystal-monohydrate (MACROBID) 100 MG capsule   3. Bilateral lower abdominal pain R10.31 HCG qualitative urine    R10.32 UA with Microscopic reflex to Culture     CBC with platelets differential     NEISSERIA GONORRHOEA PCR     CHLAMYDIA TRACHOMATIS PCR     nitroFURantoin macrocrystal-monohydrate (MACROBID) 100 MG capsule         HCG qualitative urine     UA with Microscopic reflex to Culture     CBC with platelets differential     nitroFURantoin macrocrystal-monohydrate (MACROBID) 100 MG capsule     Urine culture pending  Patient sent to the ED for evaluation of ectopic pregnancy, needs ultrasound and blood work  See Orders in Epic

## 2019-02-08 ENCOUNTER — TELEPHONE (OUTPATIENT)
Dept: URGENT CARE | Facility: URGENT CARE | Age: 24
End: 2019-02-08

## 2019-02-08 LAB
C TRACH DNA SPEC QL NAA+PROBE: NEGATIVE
N GONORRHOEA DNA SPEC QL NAA+PROBE: NEGATIVE
SPECIMEN SOURCE: NORMAL
SPECIMEN SOURCE: NORMAL

## 2019-02-08 NOTE — TELEPHONE ENCOUNTER
Reason for Call: Request for an order or referral:    Order or referral being requested: Ultrasound    Date needed: as soon as possible    Has the patient been seen by the PCP for this problem? YES    Additional comments: Patient was seen yesterday in  and found out she was pregnant. She is looking to terminate the pregnancy at [facility]. They need an order for an ultrasound asap.    Phone number Patient can be reached at:  Cell number on file:    Telephone Information:   Mobile [phone]       Best Time:  Anytime    Can we leave a detailed message on this number?  YES    Call taken on 2/8/2019 at 9:37 AM by [place]

## 2019-02-09 ENCOUNTER — NURSE TRIAGE (OUTPATIENT)
Dept: NURSING | Facility: CLINIC | Age: 24
End: 2019-02-09

## 2019-02-09 ENCOUNTER — HOSPITAL ENCOUNTER (EMERGENCY)
Facility: CLINIC | Age: 24
Discharge: HOME | End: 2019-02-09
Attending: EMERGENCY MEDICINE | Admitting: EMERGENCY MEDICINE
Payer: COMMERCIAL

## 2019-02-09 ENCOUNTER — APPOINTMENT (OUTPATIENT)
Dept: ULTRASOUND IMAGING | Facility: CLINIC | Age: 24
End: 2019-02-09
Attending: EMERGENCY MEDICINE
Payer: COMMERCIAL

## 2019-02-09 VITALS
SYSTOLIC BLOOD PRESSURE: 101 MMHG | BODY MASS INDEX: 23.04 KG/M2 | TEMPERATURE: 98.2 F | OXYGEN SATURATION: 100 % | RESPIRATION RATE: 16 BRPM | HEART RATE: 85 BPM | HEIGHT: 63 IN | DIASTOLIC BLOOD PRESSURE: 80 MMHG | WEIGHT: 130 LBS

## 2019-02-09 DIAGNOSIS — O30.001 TWIN GESTATION IN FIRST TRIMESTER, UNSPECIFIED MULTIPLE GESTATION TYPE: ICD-10-CM

## 2019-02-09 DIAGNOSIS — O21.0 MORNING SICKNESS: ICD-10-CM

## 2019-02-09 LAB
ANION GAP SERPL CALCULATED.3IONS-SCNC: 5 MMOL/L (ref 3–14)
B-HCG SERPL-ACNC: ABNORMAL IU/L (ref 0–5)
BASOPHILS # BLD AUTO: 0 10E9/L (ref 0–0.2)
BASOPHILS NFR BLD AUTO: 0.3 %
BUN SERPL-MCNC: 10 MG/DL (ref 7–30)
CALCIUM SERPL-MCNC: 8.3 MG/DL (ref 8.5–10.1)
CHLORIDE SERPL-SCNC: 106 MMOL/L (ref 94–109)
CO2 SERPL-SCNC: 24 MMOL/L (ref 20–32)
CREAT SERPL-MCNC: 0.5 MG/DL (ref 0.52–1.04)
DIFFERENTIAL METHOD BLD: ABNORMAL
EOSINOPHIL # BLD AUTO: 0.1 10E9/L (ref 0–0.7)
EOSINOPHIL NFR BLD AUTO: 0.7 %
ERYTHROCYTE [DISTWIDTH] IN BLOOD BY AUTOMATED COUNT: 13 % (ref 10–15)
GFR SERPL CREATININE-BSD FRML MDRD: >90 ML/MIN/{1.73_M2}
GLUCOSE SERPL-MCNC: 85 MG/DL (ref 70–99)
HCT VFR BLD AUTO: 34.7 % (ref 35–47)
HGB BLD-MCNC: 11.8 G/DL (ref 11.7–15.7)
IMM GRANULOCYTES # BLD: 0 10E9/L (ref 0–0.4)
IMM GRANULOCYTES NFR BLD: 0.1 %
LYMPHOCYTES # BLD AUTO: 3 10E9/L (ref 0.8–5.3)
LYMPHOCYTES NFR BLD AUTO: 41.8 %
MCH RBC QN AUTO: 28.6 PG (ref 26.5–33)
MCHC RBC AUTO-ENTMCNC: 34 G/DL (ref 31.5–36.5)
MCV RBC AUTO: 84 FL (ref 78–100)
MONOCYTES # BLD AUTO: 0.5 10E9/L (ref 0–1.3)
MONOCYTES NFR BLD AUTO: 6.5 %
NEUTROPHILS # BLD AUTO: 3.6 10E9/L (ref 1.6–8.3)
NEUTROPHILS NFR BLD AUTO: 50.6 %
NRBC # BLD AUTO: 0 10*3/UL
NRBC BLD AUTO-RTO: 0 /100
PLATELET # BLD AUTO: 323 10E9/L (ref 150–450)
POTASSIUM SERPL-SCNC: 3.9 MMOL/L (ref 3.4–5.3)
RBC # BLD AUTO: 4.12 10E12/L (ref 3.8–5.2)
SODIUM SERPL-SCNC: 135 MMOL/L (ref 133–144)
WBC # BLD AUTO: 7.1 10E9/L (ref 4–11)

## 2019-02-09 PROCEDURE — 80048 BASIC METABOLIC PNL TOTAL CA: CPT | Performed by: EMERGENCY MEDICINE

## 2019-02-09 PROCEDURE — 84702 CHORIONIC GONADOTROPIN TEST: CPT | Performed by: EMERGENCY MEDICINE

## 2019-02-09 PROCEDURE — 99284 EMERGENCY DEPT VISIT MOD MDM: CPT | Mod: 25

## 2019-02-09 PROCEDURE — 85025 COMPLETE CBC W/AUTO DIFF WBC: CPT | Performed by: EMERGENCY MEDICINE

## 2019-02-09 PROCEDURE — 76801 OB US < 14 WKS SINGLE FETUS: CPT

## 2019-02-09 RX ORDER — PROMETHAZINE HYDROCHLORIDE 25 MG/1
25 TABLET ORAL EVERY 6 HOURS PRN
Qty: 20 TABLET | Refills: 1 | Status: SHIPPED | OUTPATIENT
Start: 2019-02-09 | End: 2019-02-16

## 2019-02-09 ASSESSMENT — ENCOUNTER SYMPTOMS
VOMITING: 1
SLEEP DISTURBANCE: 1
NAUSEA: 1
HEADACHES: 1
ABDOMINAL PAIN: 1

## 2019-02-09 ASSESSMENT — MIFFLIN-ST. JEOR: SCORE: 1308.81

## 2019-02-09 NOTE — ED NOTES
Pt quite tearful. She voices concern as she is not sure if she wants to continue with this pregnancy and is contemplating , adoption, and keeping the twins. Pt given information for [facility], per her request.

## 2019-02-09 NOTE — TELEPHONE ENCOUNTER
Patient called to check on ultra sound orders, but is having cramping and back pain. Pt wants to schedule her abortiion  Nausea and vomiting  unable to eat everything comes right back up.  She rates her pain at 6-7 /10 ,unable to sit or lay comfortably. Unable to go to work due to pain and not being able to sit. Per Protocol  , care advice is to go to ER for evaluation.  Patient is on her way to [facility] ER. Patient given care advice instructions and denies any further questions at this time.    Daria Frederick RN/ [facility]        Reason for Disposition    MODERATE-SEVERE abdominal pain (e.g., interferes with normal activities, awakens from sleep)    Additional Information    Negative: Passed out (i.e., lost consciousness, collapsed and was not responding)    Negative: Shock suspected (e.g., cold/pale/clammy skin, too weak to stand, low BP, rapid pulse)    Negative: Difficult to awaken or acting confused  (e.g., disoriented, slurred speech)    Negative: Sounds like a life-threatening emergency to the triager    Negative: Followed an abdomen (stomach) injury    Negative: [1] Abdominal pain AND [2] pregnant > 20 weeks    Protocols used: PREGNANCY - ABDOMINAL PAIN LESS THAN 20 WEEKS EGA-ADULT-

## 2019-02-09 NOTE — ED AVS SNAPSHOT
Emergency Department  [address] [place] [address]  Phone:  [phone]  Fax:  [phone]                                    Kristi French   MRN: [id number]    Department:   Emergency Department   Date of Visit:  2/9/2019           After Visit Summary Signature Page    I have received my discharge instructions, and my questions have been answered. I have discussed any challenges I see with this plan with the nurse or doctor.    ..........................................................................................................................................  Patient/Patient Representative Signature      ..........................................................................................................................................  Patient Representative Print Name and Relationship to Patient    ..................................................               ................................................  Date                                   Time    ..........................................................................................................................................  Reviewed by Signature/Title    ...................................................              ..............................................  Date                                               Time          22EPIC Rev 08/18

## 2019-02-09 NOTE — TELEPHONE ENCOUNTER
Patient was instructed to go to the emergency room and be evaluated for ectopic pregnancy.  Please advise her to go to the ED  ASAP    Thank you  BLAISE Acevedo PA-C

## 2019-02-09 NOTE — ED PROVIDER NOTES
"  History     Chief Complaint:  Abdominal pain     HPI   Kristi French is a 23 year old female with a history of 2 previous deliveries who presents with abdominal pain. The patient has had lower abdominal pain for the last couple days with nausea and vomiting. She is approximately 4 weeks pregnant and has made a visit to urgent care to discuss . She was told she needed to visit the ED for an US as well as evaluation and treatment. Upon arrival, the patient reports hot flashes, migraines, and trouble sleeping. She denies any vaginal bleeding. She recently had a delivery.     Allergies:  The patient has no known drug allergies.    Medications:    macrobid  zofran    Past Medical History:    Anemia  Chronic back pain  Vaginal delivery  Pap smear of cervix with atypical squamous cells    Past Surgical History:    The patient does not have any pertinent past surgical history.    Family History:    cancer   Eye disorder    Social History:  Marital Status:  Single   Smoker:   Never   Smokeless:   Never   Alcohol:   No   Drugs:   No   Lives at:   Unknown   Arrives with:   No one   Clinic:    Unknown   Work:   Unknown     Review of Systems   Gastrointestinal: Positive for abdominal pain, nausea and vomiting.   Genitourinary: Negative for vaginal bleeding.   Neurological: Positive for headaches.   Psychiatric/Behavioral: Positive for sleep disturbance.   All other systems reviewed and are negative.        Physical Exam     Patient Vitals for the past 24 hrs:   BP Temp Temp src Pulse Resp SpO2 Height Weight   19 0931 101/80 98.2  F (36.8  C) Oral 85 16 100 % 1.6 m (5' 3\") 59 kg (130 lb)     Physical Exam  Vitals: reviewed by me  General: Pt seen on [facility], cooperative, and alert to conversation  Eyes: Tracking well, clear conjunctiva BL  ENT: MMM, midline trachea.   Lungs:   No tachypnea, no accessory muscle use. No respiratory distress.   CV: Rate as above, regular rhythm.    Abd: Soft, " non tender, no guarding, no rebound. Non distended  MSK: no peripheral edema or joint effusion.  No evidence of trauma  Skin: No rash, normal turgor and temperature  Neuro: Clear speech and no facial droop.  Psych: Not RIS, no e/o AH/VH      Emergency Department Course   Imaging:  Radiographic findings were communicated with the patient who voiced understanding of the findings.    US OB, 1st trimester w Transvaginal:  Live twin IUP with estimated gestational ages 6 weeks 3  days and  6 weeks 3 days. No acute process demonstrated. as per radiology.     Laboratory:  HC,772  BMP: creatinine 0.50, calcium 8.3, o/w WNL  CBC: WBC: 7.1, HGB: 11.8, PLT: 323    Emergency Department Course:  (952) I performed an exam of the patient as documented above.      Work up completed. Results above.    (1140) I rechecked the patient and discussed the results of their workup thus far.      Findings and plan explained. Patient discharged home with instructions regarding supportive care, medications, and reasons to return. The importance of close follow-up was reviewed.     I personally reviewed the laboratory results with them and answered all related questions prior to discharge.    Impression & Plan    Medical Decision Making:  The patient is a 23 years old female who presents to the ED with pelvic pain and abdominal discomfort likely due to twin gestation that was confirmed today in US. No evidence of ectopic pregnancy. The patient has no vaginal bleeding and this is not a threatened miscarriage. The patient has a benign abdomen. Her main complaint is cramps and some nausea but again, the kids look great and this is probably a form of morning sickness. The patient is contemplating a medical  and we went over all options that she has including adoption. The patient knows to follow up with her OBGYN. She appears to have good family support. We will discharge with very clear return to ED precautions as well as supportive  care for her morning sickness.     Diagnosis:    ICD-10-CM    1. Twin gestation in first trimester, unspecified multiple gestation type O30.001    2. Morning sickness O21.0      Disposition:  discharged to home with Phenergan    Discharge Medications:     Medication List      Started    promethazine 25 MG tablet  Commonly known as:  PHENERGAN  25 mg, Oral, EVERY 6 HOURS PRN          Scribe Disclosure:  IKane, am serving as a scribe on 2/9/2019 at 9:52 AM to personally document services performed by Marlo Myles* based on my observations and the provider's statements to me.     Kane Dominguez  2/9/2019    EMERGENCY DEPARTMENT       Marlo Myles MD  02/09/19 [id number]

## 2019-02-09 NOTE — ED NOTES
Patient discharged in stable condition. Patient received follow-up instructions and 1RXs. No questions at time of discharge. IV removed - catheter intact. Patient plans to finish antibiotic that she previously received for UTI and also plans to start an over-the-counter prenatal vitamin until she determines the course of this pregnancy.

## 2019-02-12 NOTE — PROGRESS NOTES
Subjective:  HPI  Oswestry Score: 36 %                 Objective:  System    Physical Exam    General     ROS    Assessment/Plan:    DISCHARGE REPORT    Progress reporting period is from 03/20/2018 to 03/27/2018.       SUBJECTIVE  Subjective:  When last seen on 03/27/2018, patient reported doing the exercises 3x/day.  She felt more sore while she was doing things around the house after doing the exercises, but exercises made her feel little better when she did them in the evening.  She still had pain with sitting for 30 minutes and with bending--6/10 pain.  Current status is unknown as patient did not return for additional follow-up visits.  Current Pain level:  As of 03/27/2018:  8/10.     Initial Pain level: 6/10.   Changes in function:  Unknown as patient did not return for additional follow-up visits.  Adverse reaction to treatment or activity: Unknown as patient did not return for additional follow-up visits.    OBJECTIVE  Objective:  As of 03/27/2018:  Lumbar AROM:  flexion WNL, ERP; extension 50%, increased B LBP; B SG WNL but pain increased both directions.  Decreased pain with and increased ext ROM after REIL w/self-OP, further with REIL with manual OP.  Pt to add self OP at home and have 5 y/o son sit on back when able.   Current objective measurements are unavailable as patient did not return for additional follow-up visits.    ASSESSMENT/PLAN  Patient was seen for 2 visits with treatment focusing on lumbar extension exercises and posture training to address LBP.  She had decreased pain after repeated lumbar extension in lying with self-overpressure at her last visit.  She has not returned for additional follow-up visits so current assessment is unavailable.  Updated problem list and treatment plan: Diagnosis 1:  LBP   No updated problem list or treatment plan as patient did not return for additional visits and is discharged from PT at this time.    STG/LTGs have been met or progress has been made towards  goals:  Unknown as patient did not return for additional follow-up visits.  Assessment of Progress: The patient has not returned to therapy. Current status is unknown.  Self Management Plans:  Patient has been instructed in a home treatment program.  Patient  has been instructed in self management of symptoms.  Karlolymassy continues to require the following intervention to meet STG and LTG's:  PT intervention is no longer required to meet STG/LTG.    Recommendations:  Patient is discharged from PT as she did not return for further follow-up visits.    Please refer to the daily flowsheet for treatment today, total treatment time and time spent performing 1:1 timed codes.

## 2019-02-26 ENCOUNTER — TELEPHONE (OUTPATIENT)
Dept: INTERNAL MEDICINE | Facility: CLINIC | Age: 24
End: 2019-02-26

## 2019-02-26 NOTE — TELEPHONE ENCOUNTER
Reason for Call:  Medication or medication refill:promethazine doen't work for pt works nights and makes her sleepy    Do you use a [facility]?  Name of the pharmacy and phone number for the current request:  [facility]    Name of the medication requested: wants something different makes stomach more gaseous    Other request: wants an alternative    Can we leave a detailed message on this number?     Phone number patient can be reached at: Other phone number:  See above was seen in urgent care    Best Time: asap    Call taken on 2/26/2019 at 8:48 AM by Emma Pagan

## 2019-02-27 ENCOUNTER — TELEPHONE (OUTPATIENT)
Dept: OBGYN | Facility: CLINIC | Age: 24
End: 2019-02-27

## 2019-02-27 NOTE — TELEPHONE ENCOUNTER
LM for pt to cb, she missed nurse appt.  Needs to get rescheduled prior to seeing Dr Sims.    Shavon GARCÍA R.N.  [facility]

## 2019-03-01 NOTE — TELEPHONE ENCOUNTER
LM for pt letting her know that dr visit will be cancelled if we dont hear back and get nurse visit rescheduled.    Shavon GARCÍA R.N.  [facility]

## 2019-03-19 ENCOUNTER — TELEPHONE (OUTPATIENT)
Dept: INTERNAL MEDICINE | Facility: CLINIC | Age: 24
End: 2019-03-19

## 2019-03-19 NOTE — TELEPHONE ENCOUNTER
"Took call from pt reps  Pt called us saying she wants to have an   Spoke with pt  Took a pregnancy test at urgent care  She confirmed she wanted to have an   Pt states she was told to call \"[facility]\"   Advised pt to call [facility] as this may have been who she was told to call, provided phone number  Pt appreciated call and will continue with [facility]    Dane LITTLE RN  "

## 2019-09-20 ENCOUNTER — TELEPHONE (OUTPATIENT)
Dept: INTERNAL MEDICINE | Facility: CLINIC | Age: 24
End: 2019-09-20

## 2019-09-26 NOTE — TELEPHONE ENCOUNTER
She is welcome to schedule an establish care/physical visit. Cannot place orders prior to visit since I have never seen her.

## 2019-09-26 NOTE — TELEPHONE ENCOUNTER
Patient stated you were her PCP.  Patient has a managed care insurance and [facility] is listed as her primary Clinic.

## 2019-09-27 NOTE — TELEPHONE ENCOUNTER
Called Kristi to let her know to schedule appointment to establish care.   Patient understood and will call to set up appointment.

## 2019-11-24 ENCOUNTER — NURSE TRIAGE (OUTPATIENT)
Dept: NURSING | Facility: CLINIC | Age: 24
End: 2019-11-24

## 2019-11-24 NOTE — TELEPHONE ENCOUNTER
S: Calling about possible UTI.  B: C/O of burning upon urination, frequency, and ordor.  This has been going on for about one week.  She has some left over blue and white pills from a UTI  8 moths ago. Writer explained you should not have any left over anabiotics,  even if feeling better you should complete the entire bottle.  A: Per care guidelines to seek medical help within 24 hours.    R: Going to Urgent Care today.  Leeanne Frost RN, [facility]         Reason for Disposition    Urinating more frequently than usual (i.e., frequency)    Additional Information    Negative: Shock suspected (e.g., cold/pale/clammy skin, too weak to stand, low BP, rapid pulse)    Negative: Sounds like a life-threatening emergency to the triager    Negative: [1] Unable to urinate (or only a few drops) > 4 hours AND     [2] bladder feels very full (e.g., palpable bladder or strong urge to urinate)    Negative: [1] Decreased urination and [2] drinking very little AND [2] dehydration suspected (e.g., dark urine, no urine > 12 hours, very dry mouth, very lightheaded)    Negative: Patient sounds very sick or weak to the triager    Negative: Fever > 100.5 F (38.1 C)    Negative: Side (flank) or lower back pain present    Negative: [1] Can't control passage of urine (i.e., urinary incontinence) AND [2] new onset (< 2 weeks) or worsening    Protocols used: URINARY SYMPTOMS-A-AH

## 2022-05-14 ENCOUNTER — HEALTH MAINTENANCE LETTER (OUTPATIENT)
Age: 27
End: 2022-05-14

## 2022-09-03 ENCOUNTER — HEALTH MAINTENANCE LETTER (OUTPATIENT)
Age: 27
End: 2022-09-03

## 2022-11-14 ENCOUNTER — TELEPHONE (OUTPATIENT)
Dept: INTERNAL MEDICINE | Facility: CLINIC | Age: 27
End: 2022-11-14

## 2022-11-14 NOTE — TELEPHONE ENCOUNTER
I have never seen this patient.    Recommend she establish care with a new PCP. Unfortunately I am not taking new patients at this time.

## 2022-11-14 NOTE — LETTER
[facility]  [address] [address]  [phone]  November 22, 2022  Kristi French  [address] [place] [id number]    Dear Kristi,    We received your request for Iron infusion and this needs to go through your PCP. You currently do not have one and have not been seen through [facility] at all. Please establish care with a provider to have them place those orders. Unfortunately Dr Obrien is not accepting patients currently.     Please call [phone] or schedule this through my chart.       Thank you,     [facility] staff

## 2022-11-14 NOTE — TELEPHONE ENCOUNTER
Order/Referral Request    Who is requesting: Pt     Orders being requested: Iron Infusion     Reason service is needed/diagnosis: Anemia    When are orders needed by: asap    Has this been discussed with Provider: No    Does patient have a preference on a Group/Provider/Facility? NA     Does patient have an appointment scheduled?: No    Where to send orders: N/A    Could we send this information to you in [employer] or would you prefer to receive a phone call?:   No preference   Okay to leave a detailed message?: Yes at Cell number on file:    Telephone Information:   Mobile [phone]

## 2023-02-20 NOTE — PLAN OF CARE
Data: Patient admitted to room 412 at 0833. Patient is a . Prenatal record reviewed.   Obstetric History       T1      L1     SAB0   TAB0   Ectopic0   Multiple0   Live Births1       # Outcome Date GA Lbr Harsh/2nd Weight Sex Delivery Anes PTL Lv   2 Current            1 Term 11 41w3d 06:12 / 01:46 3.25 kg (7 lb 2.6 oz) M Vag-Vacuum EPI  MAX      Name: RISHI AGUILAR SALLYMASSY      Apgar1:  8                Apgar5: 9      .  Medical History:   Past Medical History:   Diagnosis Date     Anemia      No active medical problems      Papanicolaou smear of cervix with atypical squamous cells of undetermined significance (ASC-US) 2017 ASCUS in 22 yr old.  Plan: repeat pap only in 1 year   .  Gestational age 40w1d. Vital signs per doc flowsheet. Fetal movement present. Patient reports Rule Out Labor   as reason for admission. Support persons not present.  Action: Verbal consent for EFM, external fetal monitors applied. Admission assessment completed. Patient educated on labor process. Patient instructed to report change in fetal movement, contractions, vaginal leaking of fluid or bleeding, abdominal pain, or any concerns related to the pregnancy to her nurse/physician. Patient oriented to room, call light in reach.   Response: Dr. Hudson informed of pt's arrival, sve /-6. Plan per provider is to admit pt for labor. Patient verbalized understanding of education and verbalized agreement with plan.            None known

## 2024-04-09 NOTE — TELEPHONE ENCOUNTER
Patient is continuing to have shoulder, mid and low back pain.   She went to [facility] in 2018 and would like another referral to have physical therapy with them again.      Preventive Care Visit  [facility]  BETTIE Wilkinson CNP, Pediatrics  Apr 9, 2024    Assessment & Plan   9 month old, here for preventive care.    Encounter for routine child health examination w/o abnormal findings  Normal growth and development. Parent declines covid vaccines. Bluish sclera noted by last provider who saw Alma at 6 month visit. I do note this today but is overall mild, I had my partner Dr. Mroeland examine as well. Alma has had no hx of fracture, is growing and developing well. Teeth have not erupted yet. I do not feel any additional work up or referral needed at this time. Will monitor. Follow up in 3 months for next well visit.   - DEVELOPMENTAL TEST, JORDAN    Growth      Normal OFC, length and weight    Immunizations   Patient/Parent(s) declined some/all vaccines today.  covid    Anticipatory Guidance    Reviewed age appropriate anticipatory guidance.   Reviewed Anticipatory Guidance in patient instructions    Stranger / separation anxiety    Bedtime / nap routine     Limit setting    Distraction as discipline    Reading to child    Given a book from Reach [employer] & Read    Self feeding    Table foods    Cup    Weaning    Whole milk intro at 12 month    Dental hygiene    Sleep issues    Referrals/Ongoing Specialty Care  None  Verbal Dental Referral: No teeth yet  Dental Fluoride Varnish: No, no teeth yet.      Subjective   Alma is presenting for the following:  Well Child          4/9/2024    11:29 AM   Additional Questions   Accompanied by mom   Questions for today's visit No   Surgery, major illness, or injury since last physical No           4/9/2024   Social   Lives with Parent(s)   Who takes care of your child? Parent(s)   Recent potential stressors None   History of trauma No   Family Hx mental health challenges No   Lack of transportation has limited access to appts/meds No   Do you have housing?  Yes   Are you worried about losing your housing? No         4/9/2024     11:11 AM   Health Risks/Safety   What type of car seat does your child use?  Infant car seat   Is your child's car seat forward or rear facing? Rear facing   Where does your child sit in the car?  Back seat   Are stairs gated at home? Not applicable   Do you use space heaters, wood stove, or a fireplace in your home? No   Are poisons/cleaning supplies and medications kept out of reach? Yes         2023     7:49 AM   TB Screening   Was your child born outside of the [place]? No         4/9/2024    11:11 AM   TB Screening: Consider immunosuppression as a risk factor for TB   Recent TB infection or positive TB test in family/close contacts No   Recent travel outside [place] (child/family/close contacts) No   Recent residence in high-risk group setting (correctional facility/health care facility/homeless shelter/refugee camp) No          4/9/2024    11:11 AM   Dental Screening   Have parents/caregivers/siblings had cavities in the last 2 years? No         4/9/2024   Diet   Do you have questions about feeding your baby? No   What does your baby eat? Formula    Water    Baby food/Pureed food   Formula type enfamil   How does your baby eat? Bottle    Self-feeding    Spoon feeding by caregiver   Vitamin or supplement use Vitamin D   What type of water? (!) BOTTLED    (!) FILTERED   In past 12 months, concerned food might run out No   In past 12 months, food has run out/couldn't afford more No         4/9/2024    11:11 AM   Elimination   Bowel or bladder concerns? No concerns         4/9/2024    11:11 AM   Media Use   Hours per day of screen time (for entertainment) 2         4/9/2024    11:11 AM   Sleep   Do you have any concerns about your child's sleep? (!) WAKING AT NIGHT    (!) FEEDING TO SLEEP    (!) NIGHTTIME FEEDING   Where does your baby sleep? Crib   In what position does your baby sleep? Back    (!) SIDE    (!) TUMMY         4/9/2024    11:11 AM   Vision/Hearing   Vision or hearing concerns No concerns  "        4/9/2024    11:11 AM   Development/ Social-Emotional Screen   Developmental concerns No   Does your child receive any special services? No     Development - ASQ required for C&TC    Screening tool used, reviewed with parent/guardian:   ASQ 9 M Communication Gross Motor Fine Motor Problem Solving Personal-social   Score 50 50 50 50 50   Cutoff 13.97 17.82 31.32 28.72 18.91   Result Passed Passed Passed Passed Passed     Milestones (by observation/ exam/ report) 75-90% ile  SOCIAL/EMOTIONAL:   Is shy, clingy or fearful around strangers   Shows several facial expressions, like happy, sad, angry and surprised   Looks when you call your child's name   Reacts when you leave (looks, reaches for you, or cries)   Smiles or laughs when you play peek-a-tovar  LANGUAGE/COMMUNICATION:   Makes a lot of different sounds like \"mamamamamam and bababababa\"   Lifts arms up to be picked up  COGNITIVE (LEARNING, THINKING, PROBLEM-SOLVING):   Looks for objects when dropped out of sight (like a spoon or toy)   [place] two things together  MOVEMENT/PHYSICAL DEVELOPMENT:   Gets to a sitting position by themself   Moves things from one hand to the other hand   Uses fingers to \"rake\" food towards themself   Sits without support         Objective     Exam  Temp 97.4  F (36.3  C) (Axillary)   Ht 2' 4.03\" (0.712 m)   Wt 19 lb 4.5 oz (8.746 kg)   HC 17.64\" (44.8 cm)   BMI 17.25 kg/m    41 %ile (Z= -0.23) based on WHO (Boys, 0-2 years) head circumference-for-age based on Head Circumference recorded on 4/9/2024.  41 %ile (Z= -0.22) based on WHO (Boys, 0-2 years) weight-for-age data using vitals from 4/9/2024.  32 %ile (Z= -0.48) based on WHO (Boys, 0-2 years) Length-for-age data based on Length recorded on 4/9/2024.  53 %ile (Z= 0.07) based on WHO (Boys, 0-2 years) weight-for-recumbent length data based on body measurements available as of 4/9/2024.    Physical Exam  GENERAL: Active, alert, in no acute distress.  SKIN: Clear. No significant " rash, abnormal pigmentation or lesions  HEAD: Normocephalic. Normal fontanels and sutures.  EYES: Conjunctivae and cornea normal. Red reflexes present bilaterally. Symmetric light reflex and no eye movement on cover/uncover test  EYES: very slight bluish hint to sclera  EARS: Normal canals. Tympanic membranes are normal; gray and translucent.  NOSE: Normal without discharge.  MOUTH/THROAT: Clear. No oral lesions.  NECK: Supple, no masses.  LYMPH NODES: No adenopathy  LUNGS: Clear. No rales, rhonchi, wheezing or retractions  HEART: Regular rhythm. Normal S1/S2. No murmurs. Normal femoral pulses.  ABDOMEN: Soft, non-tender, not distended, no masses or hepatosplenomegaly. Normal umbilicus and bowel sounds.   GENITALIA: Normal male external genitalia. Boogie stage I,  Testes descended bilaterally, no hernia or hydrocele.    EXTREMITIES: Hips normal with full range of motion. Symmetric extremities, no deformities  NEUROLOGIC: Normal tone throughout. Normal reflexes for age    Prior to immunization administration, verified patients identity using patient s name and date of birth. Please see Immunization Activity for additional information.     Screening Questionnaire for Pediatric Immunization    Is the child sick today?   No   Does the child have allergies to medications, food, a vaccine component, or latex?   No   Has the child had a serious reaction to a vaccine in the past?   No   Does the child have a long-term health problem with lung, heart, kidney or metabolic disease (e.g., diabetes), asthma, a blood disorder, no spleen, complement component deficiency, a cochlear implant, or a spinal fluid leak?  Is he/she on long-term aspirin therapy?   No   If the child to be vaccinated is 2 through 4 years of age, has a healthcare provider told you that the child had wheezing or asthma in the  past 12 months?   No   If your child is a baby, have you ever been told he or she has had intussusception?   No   Has the child,  sibling or parent had a seizure, has the child had brain or other nervous system problems?   No   Does the child have cancer, leukemia, AIDS, or any immune system         problem?   No   Does the child have a parent, brother, or sister with an immune system problem?   No   In the past 3 months, has the child taken medications that affect the immune system such as prednisone, other steroids, or anticancer drugs; drugs for the treatment of rheumatoid arthritis, Crohn s disease, or psoriasis; or had radiation treatments?   No   In the past year, has the child received a transfusion of blood or blood products, or been given immune (gamma) globulin or an antiviral drug?   No   Is the child/teen pregnant or is there a chance that she could become       pregnant during the next month?   No   Has the child received any vaccinations in the past 4 weeks?   No               Immunization questionnaire answers were all negative.      Patient instructed to remain in clinic for 15 minutes afterwards, and to report any adverse reactions.     Screening performed by Anca Cannon MA on 4/9/2024 at 11:31 AM.  Signed Electronically by: BETTIE Wilkinson CNP

## 2024-04-13 ENCOUNTER — HOSPITAL ENCOUNTER (EMERGENCY)
Facility: CLINIC | Age: 29
Discharge: HOME | End: 2024-04-14
Attending: EMERGENCY MEDICINE | Admitting: EMERGENCY MEDICINE
Payer: COMMERCIAL

## 2024-04-13 ENCOUNTER — APPOINTMENT (OUTPATIENT)
Dept: CT IMAGING | Facility: CLINIC | Age: 29
End: 2024-04-13
Attending: EMERGENCY MEDICINE
Payer: COMMERCIAL

## 2024-04-13 DIAGNOSIS — J02.0 STREP PHARYNGITIS: ICD-10-CM

## 2024-04-13 DIAGNOSIS — Q31.8 ANOMALY OF EPIGLOTTIS: ICD-10-CM

## 2024-04-13 DIAGNOSIS — U07.1 COVID-19 VIRUS INFECTION: ICD-10-CM

## 2024-04-13 LAB
BASOPHILS # BLD AUTO: 0.1 10E3/UL (ref 0–0.2)
BASOPHILS NFR BLD AUTO: 0 %
EOSINOPHIL # BLD AUTO: 0.2 10E3/UL (ref 0–0.7)
EOSINOPHIL NFR BLD AUTO: 2 %
ERYTHROCYTE [DISTWIDTH] IN BLOOD BY AUTOMATED COUNT: 12.6 % (ref 10–15)
GROUP A STREP BY PCR: DETECTED
HCT VFR BLD AUTO: 36.3 % (ref 35–47)
HGB BLD-MCNC: 12.3 G/DL (ref 11.7–15.7)
IMM GRANULOCYTES # BLD: 0 10E3/UL
IMM GRANULOCYTES NFR BLD: 0 %
LYMPHOCYTES # BLD AUTO: 3.6 10E3/UL (ref 0.8–5.3)
LYMPHOCYTES NFR BLD AUTO: 30 %
MCH RBC QN AUTO: 29.7 PG (ref 26.5–33)
MCHC RBC AUTO-ENTMCNC: 33.9 G/DL (ref 31.5–36.5)
MCV RBC AUTO: 88 FL (ref 78–100)
MONOCYTES # BLD AUTO: 0.9 10E3/UL (ref 0–1.3)
MONOCYTES NFR BLD AUTO: 8 %
MONOCYTES NFR BLD AUTO: NEGATIVE %
NEUTROPHILS # BLD AUTO: 7 10E3/UL (ref 1.6–8.3)
NEUTROPHILS NFR BLD AUTO: 60 %
NRBC # BLD AUTO: 0 10E3/UL
NRBC BLD AUTO-RTO: 0 /100
PLATELET # BLD AUTO: 374 10E3/UL (ref 150–450)
RBC # BLD AUTO: 4.14 10E6/UL (ref 3.8–5.2)
WBC # BLD AUTO: 11.8 10E3/UL (ref 4–11)

## 2024-04-13 PROCEDURE — 96361 HYDRATE IV INFUSION ADD-ON: CPT

## 2024-04-13 PROCEDURE — 87651 STREP A DNA AMP PROBE: CPT | Performed by: EMERGENCY MEDICINE

## 2024-04-13 PROCEDURE — 70491 CT SOFT TISSUE NECK W/DYE: CPT

## 2024-04-13 PROCEDURE — 86308 HETEROPHILE ANTIBODY SCREEN: CPT | Performed by: EMERGENCY MEDICINE

## 2024-04-13 PROCEDURE — 80048 BASIC METABOLIC PNL TOTAL CA: CPT | Performed by: EMERGENCY MEDICINE

## 2024-04-13 PROCEDURE — 258N000003 HC RX IP 258 OP 636: Performed by: EMERGENCY MEDICINE

## 2024-04-13 PROCEDURE — 250N000011 HC RX IP 250 OP 636: Performed by: EMERGENCY MEDICINE

## 2024-04-13 PROCEDURE — 96375 TX/PRO/DX INJ NEW DRUG ADDON: CPT

## 2024-04-13 PROCEDURE — 99285 EMERGENCY DEPT VISIT HI MDM: CPT | Mod: 25

## 2024-04-13 PROCEDURE — 87637 SARSCOV2&INF A&B&RSV AMP PRB: CPT | Performed by: EMERGENCY MEDICINE

## 2024-04-13 PROCEDURE — 85025 COMPLETE CBC W/AUTO DIFF WBC: CPT | Performed by: EMERGENCY MEDICINE

## 2024-04-13 PROCEDURE — 36415 COLL VENOUS BLD VENIPUNCTURE: CPT | Performed by: EMERGENCY MEDICINE

## 2024-04-13 RX ORDER — DEXAMETHASONE SODIUM PHOSPHATE 10 MG/ML
10 INJECTION, SOLUTION INTRAMUSCULAR; INTRAVENOUS ONCE
Status: COMPLETED | OUTPATIENT
Start: 2024-04-13 | End: 2024-04-13

## 2024-04-13 RX ORDER — AMPICILLIN AND SULBACTAM 2; 1 G/1; G/1
3 INJECTION, POWDER, FOR SOLUTION INTRAMUSCULAR; INTRAVENOUS ONCE
Status: COMPLETED | OUTPATIENT
Start: 2024-04-13 | End: 2024-04-14

## 2024-04-13 RX ORDER — KETOROLAC TROMETHAMINE 15 MG/ML
15 INJECTION, SOLUTION INTRAMUSCULAR; INTRAVENOUS ONCE
Status: COMPLETED | OUTPATIENT
Start: 2024-04-13 | End: 2024-04-13

## 2024-04-13 RX ADMIN — DEXAMETHASONE SODIUM PHOSPHATE 10 MG: 10 INJECTION, SOLUTION INTRAMUSCULAR; INTRAVENOUS at 23:36

## 2024-04-13 RX ADMIN — SODIUM CHLORIDE 1000 ML: 9 INJECTION, SOLUTION INTRAVENOUS at 23:37

## 2024-04-13 RX ADMIN — KETOROLAC TROMETHAMINE 15 MG: 15 INJECTION, SOLUTION INTRAMUSCULAR; INTRAVENOUS at 23:35

## 2024-04-13 ASSESSMENT — COLUMBIA-SUICIDE SEVERITY RATING SCALE - C-SSRS
6. HAVE YOU EVER DONE ANYTHING, STARTED TO DO ANYTHING, OR PREPARED TO DO ANYTHING TO END YOUR LIFE?: NO
2. HAVE YOU ACTUALLY HAD ANY THOUGHTS OF KILLING YOURSELF IN THE PAST MONTH?: NO
1. IN THE PAST MONTH, HAVE YOU WISHED YOU WERE DEAD OR WISHED YOU COULD GO TO SLEEP AND NOT WAKE UP?: NO

## 2024-04-14 VITALS
HEART RATE: 80 BPM | WEIGHT: 137.79 LBS | HEIGHT: 62 IN | TEMPERATURE: 98.8 F | RESPIRATION RATE: 16 BRPM | DIASTOLIC BLOOD PRESSURE: 78 MMHG | OXYGEN SATURATION: 99 % | SYSTOLIC BLOOD PRESSURE: 111 MMHG | BODY MASS INDEX: 25.36 KG/M2

## 2024-04-14 LAB
ANION GAP SERPL CALCULATED.3IONS-SCNC: 10 MMOL/L (ref 7–15)
BUN SERPL-MCNC: 7.8 MG/DL (ref 6–20)
CALCIUM SERPL-MCNC: 9.1 MG/DL (ref 8.6–10)
CHLORIDE SERPL-SCNC: 102 MMOL/L (ref 98–107)
CREAT SERPL-MCNC: 0.69 MG/DL (ref 0.51–0.95)
DEPRECATED HCO3 PLAS-SCNC: 25 MMOL/L (ref 22–29)
EGFRCR SERPLBLD CKD-EPI 2021: >90 ML/MIN/1.73M2
FLUAV RNA SPEC QL NAA+PROBE: NEGATIVE
FLUBV RNA RESP QL NAA+PROBE: NEGATIVE
GLUCOSE SERPL-MCNC: 101 MG/DL (ref 70–99)
POTASSIUM SERPL-SCNC: 3.6 MMOL/L (ref 3.4–5.3)
RSV RNA SPEC NAA+PROBE: NEGATIVE
SARS-COV-2 RNA RESP QL NAA+PROBE: POSITIVE
SODIUM SERPL-SCNC: 137 MMOL/L (ref 135–145)

## 2024-04-14 PROCEDURE — 96365 THER/PROPH/DIAG IV INF INIT: CPT | Mod: 59

## 2024-04-14 PROCEDURE — 250N000011 HC RX IP 250 OP 636: Performed by: EMERGENCY MEDICINE

## 2024-04-14 PROCEDURE — 250N000009 HC RX 250: Performed by: EMERGENCY MEDICINE

## 2024-04-14 RX ORDER — IOPAMIDOL 755 MG/ML
500 INJECTION, SOLUTION INTRAVASCULAR ONCE
Status: COMPLETED | OUTPATIENT
Start: 2024-04-14 | End: 2024-04-14

## 2024-04-14 RX ADMIN — AMPICILLIN SODIUM AND SULBACTAM SODIUM 3 G: 2; 1 INJECTION, POWDER, FOR SOLUTION INTRAMUSCULAR; INTRAVENOUS at 00:12

## 2024-04-14 RX ADMIN — SODIUM CHLORIDE 65 ML: 9 INJECTION, SOLUTION INTRAVENOUS at 00:01

## 2024-04-14 RX ADMIN — IOPAMIDOL 90 ML: 755 INJECTION, SOLUTION INTRAVENOUS at 00:01

## 2024-04-14 NOTE — ED TRIAGE NOTES
Presents to triage with c/o pharyngitis and L sided otalgia that started about 5 days ago and has been worsening. Patient states she is having difficulty swallowing d/t pain and throat swelling. Tonsils very swollen and close to touching.

## 2024-04-14 NOTE — ED PROVIDER NOTES
"  History     Chief Complaint:  Pharyngitis and Otalgia     The history is provided by the patient.      Kristi French is a 29 year old female who presents for evaluation of pharyngitis and otalgia. Kristi reports 5 days of pharyngitis, left otalgia, and dysphagia. She denies fever, shortness of breath, nausea, or vomiting. Patient denies possibility of pregnancy. No known sick contacts. Tonsils intact.    Independent Historian:   None - Patient Only    Medications:    The patient is not currently taking any prescribed medications.     Past Medical History:    Anemia    Physical Exam   Patient Vitals for the past 24 hrs:   BP Temp Temp src Pulse Resp SpO2 Height Weight   04/14/24 0057 111/78 -- -- 80 16 99 % -- --   04/13/24 2314 (!) 124/90 98.8  F (37.1  C) Temporal 104 18 99 % 1.575 m (5' 2\") 62.5 kg (137 lb 12.6 oz)     Physical Exam  General: Patient is awake, alert  Head: The scalp, face, and head appear normal  Eyes: The pupils are equal, round, and reactive to light. Conjunctivae and sclerae are normal  ENT: Significant bilateral tonsillar erythema, edema with exudates.  Uvula is midline.  No signs of peritonsillar abscess.  Neck: Normal range of motion.   CV: Tachycardic but regular  Resp: Lungs are clear without wheezes or rales. No respiratory distress.   GI: Abdomen is soft, no rigidity, guarding, or rebound. No distension. No tenderness to palpation in any quadrant.     MS: Normal tone. Joints grossly normal without effusions. No asymmetric leg swelling, calf or thigh tenderness.    Skin: No rash or lesions noted. Normal capillary refill noted  Neuro: Speech is normal and fluent. Face is symmetric. Moving all extremities.   Psych:  Normal affect.  Appropriate interactions.    Emergency Department Course     Imaging:  CT Soft Tissue Neck w Contrast   Final Result   IMPRESSION:    1.  Right vocal cord appears bulbous and deviated medially. There is dilatation of the right piriform sinus. Right " aryepiglottic fold and arytenoid cartilage are deviated to the right. This most likely reflects right vocal cord paralysis. No obvious    etiology on current exam. However, there is not expected dilatation of the right laryngeal ventricle, which is atypical. Subtle right vocal cord/right glottis malignancy could be possible. Recommend nonemergent ENT referral to further evaluate these    findings.      2.  Bilateral palatine tonsillitis. Left palatine tonsil small peritonsillar phlegmon described above. This is likely not drainable.       Dr Stoll was notified by Dr Magdaleno Rojas at  12:40 AM 04/14/2024 CST/CDT.          Report per radiology    Laboratory:  Labs Ordered and Resulted from Time of ED Arrival to Time of ED Departure   INFLUENZA A/B, RSV, & SARS-COV2 PCR - Abnormal       Result Value    Influenza A PCR Negative      Influenza B PCR Negative      RSV PCR Negative      SARS CoV2 PCR Positive (*)    BASIC METABOLIC PANEL - Abnormal    Sodium 137      Potassium 3.6      Chloride 102      Carbon Dioxide (CO2) 25      Anion Gap 10      Urea Nitrogen 7.8      Creatinine 0.69      GFR Estimate >90      Calcium 9.1      Glucose 101 (*)    CBC WITH PLATELETS AND DIFFERENTIAL - Abnormal    WBC Count 11.8 (*)     RBC Count 4.14      Hemoglobin 12.3      Hematocrit 36.3      MCV 88      MCH 29.7      MCHC 33.9      RDW 12.6      Platelet Count 374      % Neutrophils 60      % Lymphocytes 30      % Monocytes 8      % Eosinophils 2      % Basophils 0      % Immature Granulocytes 0      NRBCs per 100 WBC 0      Absolute Neutrophils 7.0      Absolute Lymphocytes 3.6      Absolute Monocytes 0.9      Absolute Eosinophils 0.2      Absolute Basophils 0.1      Absolute Immature Granulocytes 0.0      Absolute NRBCs 0.0     GROUP A STREPTOCOCCUS PCR THROAT SWAB - Abnormal    Group A strep by PCR Detected (*)    MONONUCLEOSIS SCREEN - Normal    Mononucleosis Screen Negative          Procedures   None    Emergency Department  Course & Assessments:  Assessments/Consultations/Discussion of Management or Tests:  ED Course as of 04/14/24 0217   Sat Apr 13, 2024   2333 I obtained history and examined the patient as noted above.    Sun Apr 14, 2024   0040 I spoke with radiology regarding CT findings.    0051 I rechecked the patient and explained findings. We discussed plans for discharge and the patient is comfortable with this plan.        Interventions:  Medications   sodium chloride 0.9% BOLUS 1,000 mL (0 mLs Intravenous Stopped 4/14/24 0052)   ketorolac (TORADOL) injection 15 mg (15 mg Intravenous $Given 4/13/24 2335)   dexAMETHasone PF (DECADRON) injection 10 mg (10 mg Intravenous $Given 4/13/24 2336)   ampicillin-sulbactam (UNASYN) 3 g vial to attach to  mL bag (0 g Intravenous Stopped 4/14/24 0052)   iopamidol (ISOVUE-370) solution 500 mL (90 mLs Intravenous $Given 4/14/24 0001)   for CT scan flush use (65 mLs Intravenous $Given 4/14/24 0001)        Social Determinants of Health affecting care:   None    Disposition:  The patient was discharged to home.     Impression & Plan    CMS Diagnoses: None    MIPS (If applicable):  N/A    Medical Decision Making:  Patient is an otherwise healthy 29-year-old female who presents emergency ferment with significant throat pain and left-sided ear pain.  Patient was found to have evidence of tonsillitis likely caused by strep pharyngitis.  Unfortunately she also has concomitant COVID-19 infection which is likely exacerbating her symptoms.  Patient was treated with antibiotics and symptomatic cares in the emergency department and felt much improved.  She is able to tolerate her own secretions and has no signs of additional systemic infection.  CT scan did show an incidental abnormality of her right vocal cord.  These results were discussed with the patient.  She has had a hoarse raspy voice over the last couple months.  She denies any difficulty breathing.  Recommended close follow-up with ENT  for further investigation of possible vocal cord paralysis.  Patient will otherwise be sent home with course of antibiotics and can return the emergency room with any new or worsening symptoms.    Diagnosis:    ICD-10-CM    1. Strep pharyngitis  J02.0       2. 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  4/13/2024   Domenico Stoll MD Battista, Christopher Joseph, MD  04/14/24 0219

## 2024-04-22 ENCOUNTER — OFFICE VISIT (OUTPATIENT)
Dept: OBGYN | Facility: CLINIC | Age: 29
End: 2024-04-22
Payer: COMMERCIAL

## 2024-04-22 VITALS
DIASTOLIC BLOOD PRESSURE: 64 MMHG | HEIGHT: 62 IN | BODY MASS INDEX: 24.84 KG/M2 | WEIGHT: 135 LBS | SYSTOLIC BLOOD PRESSURE: 110 MMHG

## 2024-04-22 DIAGNOSIS — Z30.432 ENCOUNTER FOR REMOVAL OF INTRAUTERINE CONTRACEPTIVE DEVICE: Primary | ICD-10-CM

## 2024-04-22 PROCEDURE — 58301 REMOVE INTRAUTERINE DEVICE: CPT | Performed by: ADVANCED PRACTICE MIDWIFE

## 2024-04-22 NOTE — NURSING NOTE
"Chief Complaint   Patient presents with    IUD     Here for IUD removal       Initial /64   Ht 1.575 m (5' 2\")   Wt 61.2 kg (135 lb)   BMI 24.69 kg/m   Estimated body mass index is 24.69 kg/m  as calculated from the following:    Height as of this encounter: 1.575 m (5' 2\").    Weight as of this encounter: 61.2 kg (135 lb).  BP completed using cuff size: regular    Questioned patient about current smoking habits.  Pt. has never smoked.          The following HM Due: pap smear    Quin Rossi CMA on 2024 at 3:43 PM    "

## 2024-04-22 NOTE — PROGRESS NOTES
IUD Removal:  SUBJECTIVE:    Is a pregnancy test required: No.  Was a consent obtained?  Yes    Kristi French is a 29 year old female,, No LMP recorded. (Menstrual status: IUD). who presents today for IUD removal. Her current IUD was placed 6 years ago. She has not had problems with the IUD.  She thinks it is causing back pain. She requests removal of the IUD because she wants to change her method of contraception    Today's PHQ-2 Score:       2012     4:49 PM   PHQ-2 (  [employer])   Q1: Little interest or pleasure in doing things 0   Q2: Feeling down, depressed or hopeless 0   PHQ-2 Score 0       PROCEDURE:    A speculum exam was performed and the cervix was visualized. The IUD string was visualized. Using ring forceps, the string  was grasped and the IUD removed intact.    POST PROCEDURE:    The patient tolerated the procedure well. Patient was discharged in stable condition.    Call if bleeding, pain or fever occur., Birth control counseling given., and Use of condoms/foam discussed.    Ayaka Bartlett CNM